# Patient Record
Sex: FEMALE | Race: WHITE | Employment: OTHER | URBAN - METROPOLITAN AREA
[De-identification: names, ages, dates, MRNs, and addresses within clinical notes are randomized per-mention and may not be internally consistent; named-entity substitution may affect disease eponyms.]

---

## 2017-09-28 ENCOUNTER — HOSPITAL ENCOUNTER (OUTPATIENT)
Dept: MAMMOGRAPHY | Age: 82
Discharge: HOME | End: 2017-09-28
Attending: INTERNAL MEDICINE
Payer: MEDICARE

## 2017-09-28 DIAGNOSIS — Z12.31 VISIT FOR SCREENING MAMMOGRAM: ICD-10-CM

## 2017-09-28 PROCEDURE — 77063 BREAST TOMOSYNTHESIS BI: CPT

## 2017-12-13 ENCOUNTER — HOSPITAL ENCOUNTER (OUTPATIENT)
Dept: GENERAL RADIOLOGY | Age: 82
Discharge: HOME | End: 2017-12-13
Attending: INTERNAL MEDICINE
Payer: MEDICARE

## 2017-12-13 DIAGNOSIS — M54.41 ACUTE BILATERAL LOW BACK PAIN WITH BILATERAL SCIATICA: ICD-10-CM

## 2017-12-13 DIAGNOSIS — M54.42 ACUTE BILATERAL LOW BACK PAIN WITH BILATERAL SCIATICA: ICD-10-CM

## 2017-12-13 PROCEDURE — 72100 X-RAY EXAM L-S SPINE 2/3 VWS: CPT

## 2017-12-14 NOTE — PROGRESS NOTES
pls call- no fracture seen. You do have moderate bilateral hip arthritis and degenerative disc disease at l3/4. Continue tylenol. Call if worse or if you develop leg weakness.

## 2018-10-29 ENCOUNTER — HOSPITAL ENCOUNTER (OUTPATIENT)
Dept: MAMMOGRAPHY | Age: 83
Discharge: HOME | End: 2018-10-29
Attending: INTERNAL MEDICINE
Payer: MEDICARE

## 2018-10-29 DIAGNOSIS — Z12.39 SCREENING BREAST EXAMINATION: ICD-10-CM

## 2018-10-29 PROCEDURE — 77067 SCR MAMMO BI INCL CAD: CPT

## 2019-01-10 ENCOUNTER — HOSPITAL ENCOUNTER (OUTPATIENT)
Dept: ULTRASOUND IMAGING | Age: 84
Discharge: HOME | End: 2019-01-10
Attending: INTERNAL MEDICINE
Payer: MEDICARE

## 2019-01-10 DIAGNOSIS — R14.0 BLOATING: ICD-10-CM

## 2019-01-10 PROCEDURE — 76700 US EXAM ABDOM COMPLETE: CPT

## 2019-01-11 NOTE — PROGRESS NOTES
pls call- abdominal ultrasound shows very small left kidney cyst.  Should not cause any problems.  Otherwise normal.

## 2019-07-18 PROBLEM — S09.90XA HEAD INJURY: Status: ACTIVE | Noted: 2019-07-18

## 2019-10-10 ENCOUNTER — HOSPITAL ENCOUNTER (OUTPATIENT)
Dept: GENERAL RADIOLOGY | Age: 84
Discharge: HOME | End: 2019-10-10
Attending: INTERNAL MEDICINE
Payer: MEDICARE

## 2019-10-10 DIAGNOSIS — R06.02 SOB (SHORTNESS OF BREATH): ICD-10-CM

## 2019-10-10 PROCEDURE — 71046 X-RAY EXAM CHEST 2 VIEWS: CPT

## 2019-11-21 ENCOUNTER — HOSPITAL ENCOUNTER (OUTPATIENT)
Dept: MAMMOGRAPHY | Age: 84
Discharge: HOME | End: 2019-11-21
Attending: INTERNAL MEDICINE
Payer: MEDICARE

## 2019-11-21 DIAGNOSIS — Z12.31 VISIT FOR SCREENING MAMMOGRAM: ICD-10-CM

## 2019-11-21 PROCEDURE — 77067 SCR MAMMO BI INCL CAD: CPT

## 2020-01-20 ENCOUNTER — OFFICE VISIT (OUTPATIENT)
Dept: NEUROLOGY | Age: 85
End: 2020-01-20

## 2020-01-20 VITALS
BODY MASS INDEX: 23.16 KG/M2 | SYSTOLIC BLOOD PRESSURE: 130 MMHG | RESPIRATION RATE: 18 BRPM | DIASTOLIC BLOOD PRESSURE: 82 MMHG | TEMPERATURE: 97.4 F | WEIGHT: 139 LBS | HEART RATE: 90 BPM | OXYGEN SATURATION: 97 % | HEIGHT: 65 IN

## 2020-01-20 DIAGNOSIS — R47.1 DYSARTHRIA: Primary | ICD-10-CM

## 2020-01-20 DIAGNOSIS — R26.9 GAIT DISTURBANCE: ICD-10-CM

## 2020-01-20 DIAGNOSIS — R29.898 WEAKNESS OF LEFT FOOT: ICD-10-CM

## 2020-01-20 RX ORDER — MELATONIN
1000 2 TIMES DAILY
COMMUNITY

## 2020-01-20 RX ORDER — FAMOTIDINE 10 MG/1
10 TABLET ORAL 2 TIMES DAILY
COMMUNITY

## 2020-01-20 RX ORDER — ACETAMINOPHEN 500 MG
TABLET ORAL
COMMUNITY

## 2020-01-20 NOTE — PROGRESS NOTES
Chief Complaint   Patient presents with    New Patient     C/O speech changes, and left foot drags when walking     Visit Vitals  /82 (BP 1 Location: Right arm, BP Patient Position: Sitting)   Pulse 90   Temp 97.4 °F (36.3 °C) (Oral)   Resp 18   Ht 5' 5\" (1.651 m)   Wt 63 kg (139 lb)   SpO2 97%   BMI 23.13 kg/m²

## 2020-01-21 ENCOUNTER — TELEPHONE (OUTPATIENT)
Dept: NEUROLOGY | Age: 85
End: 2020-01-21

## 2020-01-21 NOTE — TELEPHONE ENCOUNTER
----- Message from Earl Salazar sent at 1/21/2020 12:18 PM EST -----  Regarding: Dr. Nelson Muñiz Message/Vendor Calls    Caller's first and last name:Rubén Jama(son)      Reason for call: schedule MRI      Callback required yes/no and why:yes      Best contact number(s):[phone]      Details to clarify the request: Pt's son stated he received a call to schedule an appt for an MRI and requested a call back.       Earl Salazar

## 2020-01-22 NOTE — PROGRESS NOTES
Neurology Consult Note      HISTORY PROVIDED BY: patient, son, and brother-in-law a retired plastic surgeon. Chief Complaint:   Chief Complaint   Patient presents with    New Patient     C/O speech changes, and left foot drags when walking      Subjective:    Nico Mesa is a 82 y.o. right handed female who presents in consultation for speech and walking difficulties. Pt reports onset of sxs 2017, was speaking more softly, she was seen by ENT thought she might have acute laryngitis and is treated for GERD. Speech difficulty does not fluctuate, but is worse when she is fatigued. Feels that it has definitely progressed since onset. She started having left foot drop a year ago. Her balance is poor. No numbness reported. She was seen in Ortho by Dr. Briana Rosas 10/29/19 and told she had neuropathy. She was seen at [facility] speech and told her vocal cords were normal.  She is also seeing a speech pathologist at [facility]. No swallowing trouble, no formal swallowing eval.  She had NCS/EMG in ortho for CTS many years ago, none recently. No change in b/b. No fasciculations. Fractured right arm in mid-December, fell after toe caught on rug. Past Medical History:   Diagnosis Date    Atrial fibrillation ([address] 5/6/2015    CTS (carpal tunnel syndrome)     s/p release bilaterally    Environmental allergies     GERD (gastroesophageal reflux disease)     Hematuria 10/10/2011    Hypercholesterolemia 10/10/2011    Menopause     LMP-unknown    OA (osteoarthritis)     Seizure disorder ([address] 1983    had two seizures in sleep, one at daughter's wedding, all around the same time. On dilantin.     Vitamin D deficiency       Past Surgical History:   Procedure Laterality Date    D&C AFTER DELIVERY      HX BREAST BIOPSY Right long ago    Benign surgical biopsy    HX CARPAL TUNNEL RELEASE      SINUS SURGERY PROC UNLISTED        Social History     Socioeconomic History    Marital status: UNKNOWN     Spouse name: Not on file  Number of children: Not on file    Years of education: Not on file    Highest education level: Not on file   Occupational History    Occupation: Retired, [employer] and [employer] 1980 Financial resource strain: Not on file    Food insecurity:     Worry: Not on file     Inability: Not on file   PreViser needs:     Medical: Not on file     Non-medical: Not on file   Tobacco Use    Smoking status: Never Smoker    Smokeless tobacco: Never Used   Substance and Sexual Activity    Alcohol use: Yes     Alcohol/week: 7.0 standard drinks     Types: 7 Glasses of wine per week     Comment: wine occasionally    Drug use: Never    Sexual activity: Not on file   Lifestyle    Physical activity:     Days per week: Not on file     Minutes per session: Not on file    Stress: Not on file   Relationships    Social connections:     Talks on phone: Not on file     Gets together: Not on file     Attends Sikh service: Not on file     Active member of club or organization: Not on file     Attends meetings of clubs or organizations: Not on file     Relationship status: Not on file    Intimate partner violence:     Fear of current or ex partner: Not on file     Emotionally abused: Not on file     Physically abused: Not on file     Forced sexual activity: Not on file   Other Topics Concern    Not on file   Social History Narrative    Lives in [address], alone, has help at night.       Family History   Problem Relation Age of Onset    Other Mother         phlebitis, Dec 89yo    Dementia Mother     Pulmonary Fibrosis Father         Dec 79yo    No Known Problems Sister     Stroke Brother     Atrial Fibrillation Brother     Other Sister         PNA, Dec 85yo    Dementia Sister     No Known Problems Son     No Known Problems Son     No Known Problems Daughter          Objective:   ROS    Allergies   Allergen Reactions    Augmentin [Amoxicillin-Pot Clavulanate] Nausea and Vomiting    Nasonex [Mometasone] Unknown (comments)        Meds:  Outpatient Medications Prior to Visit   Medication Sig Dispense Refill    famotidine (PEPCID) 10 mg tablet Take 10 mg by mouth two (2) times a day.  cholecalciferol (VITAMIN D3) (1000 Units /25 mcg) tablet Take 1,000 Units by mouth two (2) times a day.  fexofenadine HCl (ALLEGRA PO) Take  by mouth.  acetaminophen (TYLENOL EXTRA STRENGTH) 500 mg tablet Take  by mouth every six (6) hours as needed for Pain.  phenytoin ER (DILANTIN ER) 100 mg ER capsule TAKE 1 CAPSULE TWICE A  Cap 4    apixaban (ELIQUIS) 5 mg tablet TAKE 1 TABLET TWICE A  Tab 4    multivitamins-minerals-lutein (CENTRUM SILVER) Tab Take  by mouth.  olopatadine (PATANOL) 0.1 % ophthalmic solution Administer 2 Drops to both eyes two (2) times a day.  furosemide (LASIX) 40 mg tablet Take 1 Tab by mouth daily as needed (swelling). 20 Tab 1    ciprofloxacin HCl (CIPRO) 500 mg tablet Take 1 Tab by mouth two (2) times a day. 14 Tab 1    omeprazole (PRILOSEC OTC) 20 mg tablet Take 20 mg by mouth daily. No facility-administered medications prior to visit. Imaging:  MRI Results (most recent):  No results found for this or any previous visit. CT Results (most recent):  No results found for this or any previous visit.      Reviewed records in [facility] and [facility] tab today    Lab Review   Results for orders placed or performed in visit on 12/27/19   VITAMIN D, 25 HYDROXY   Result Value Ref Range    VITAMIN D, 25-HYDROXY 28.2 (L) 30.0 - 105.8 ng/mL   METABOLIC PANEL, COMPREHENSIVE   Result Value Ref Range    Glucose 87 65 - 99 mg/dL    BUN 11 8 - 27 mg/dL    Creatinine 0.52 (L) 0.57 - 1.00 mg/dL    GFR est non-AA 89 >59 mL/min/1.73    GFR est  >59 mL/min/1.73    BUN/Creatinine ratio 21 12 - 28    Sodium 134 134 - 144 mmol/L    Potassium 4.3 3.5 - 5.2 mmol/L    Chloride 96 96 - 106 mmol/L    CO2 21 20 - 29 mmol/L    Calcium 9.0 8.7 - 10.3 mg/dL Protein, total 6.1 6.0 - 8.5 g/dL    Albumin 4.4 3.5 - 4.7 g/dL    GLOBULIN, TOTAL 1.7 1.5 - 4.5 g/dL    A-G Ratio 2.6 (H) 1.2 - 2.2    Bilirubin, total 0.7 0.0 - 1.2 mg/dL    Alk. phosphatase 117 39 - 117 IU/L    AST (SGOT) 25 0 - 40 IU/L    ALT (SGPT) 23 0 - 32 IU/L        Exam:  Visit Vitals  /82 (BP 1 Location: Right arm, BP Patient Position: Sitting)   Pulse 90   Temp 97.4 °F (36.3 °C) (Oral)   Resp 18   Ht 5' 5\" (1.651 m)   Wt 63 kg (139 lb)   LMP  (LMP Unknown)   SpO2 97%   BMI 23.13 kg/m²     General:  Alert, cooperative, no distress. Head:  Normocephalic, without obvious abnormality, atraumatic. Respiratory:  Heart:   Non labored breathing  Regular rate and rhythm, no murmurs   Neck:   2+ carotids, no bruits   Extremities: Warm, no cyanosis or edema. Pulses: 2+ radial pulses. Neurologic:  MS: Alert and oriented x 4, speech dysarthria, nasal quality, Language intact. Attention and fund of knowledge appropriate. Recent and remote memory not fully evaluated, seem to look to family to assist with details of history.    Cranial Nerves:  II: visual fields Full to confrontation   II: pupils Equal, round, reactive to light   II: optic disc    III,VII: ptosis none   III,IV,VI: extraocular muscles  EOMI, no nystagmus or diplopia   V: facial light touch sensation  normal   VII: facial muscle function   symmetric   VIII: hearing intact   IX: soft palate elevation  normal   XI: trapezius strength  5/5   XI: sternocleidomastoid strength 5/5   XII: tongue  Midline, atrophy, no fasciculations seen     Motor: normal bulk and tone, no tremor              Strength: 5/5 throughout except left DF 5-/5, no PD  Sensory: intact to LT, PP, mod dec vibratory sensation in left great toe  Coordination: FTN and HTS intact, AMANDA intact,Romberg negative  Gait: Slap foot gait on left  Reflexes: 2+ symmetric, toes downgoing         Assessment/Plan   Pt is an 82 y.o. right handed female with onset of speech changes in 2017, progressively worsening, and left foot drop beginning about one year ago. Exam with dysarthria, left foot drop, dec vibratory sensation in left great toe, slap foot gait on left, o/w unremarkable. Certainly motor neuron disease is strong consideration given bulbar as well as axial weakness, but no UMN findings appreciated on exam, atypical for PMA. She may have two separate issues such as stroke or mass causing dysarthria especially given multiple stroke risk factors, and second issue such as a peripheral peroneal neuropathy leading to foot drop. Recommend we start her evaluation with MRI brain without contrast and NCS/EMG. F/u in clinic in 6 weeks, instructed to call in the interim if needed. ICD-10-CM ICD-9-CM    1. Dysarthria R47.1 784.51 MRI BRAIN WO CONT      EMG NCV MOTOR WITH F/WAVE PER NERVE   2. Weakness of left foot R29.898 734 MRI BRAIN WO CONT      EMG NCV MOTOR WITH F/WAVE PER NERVE   3. Gait disturbance R26.9 781. 2 EMG NCV MOTOR WITH F/WAVE PER NERVE       Signed:   Franca Wallace MD  1/20/2020

## 2020-01-27 ENCOUNTER — HOSPITAL ENCOUNTER (OUTPATIENT)
Dept: GENERAL RADIOLOGY | Age: 85
Discharge: HOME | End: 2020-01-27
Attending: INTERNAL MEDICINE
Payer: MEDICARE
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
## 2020-01-27 PROCEDURE — 71101 X-RAY EXAM UNILAT RIBS/CHEST: CPT

## 2020-02-03 ENCOUNTER — OFFICE VISIT (OUTPATIENT)
Dept: NEUROLOGY | Age: 85
End: 2020-02-03

## 2020-02-03 VITALS
HEIGHT: 65 IN | RESPIRATION RATE: 16 BRPM | WEIGHT: 139 LBS | SYSTOLIC BLOOD PRESSURE: 135 MMHG | OXYGEN SATURATION: 97 % | HEART RATE: 83 BPM | BODY MASS INDEX: 23.16 KG/M2 | DIASTOLIC BLOOD PRESSURE: 73 MMHG

## 2020-02-03 DIAGNOSIS — R47.1 DYSARTHRIA: ICD-10-CM

## 2020-02-03 DIAGNOSIS — R29.898 LEFT LEG WEAKNESS: ICD-10-CM

## 2020-02-03 NOTE — PROGRESS NOTES
[address] [facility]  [address] [facility], [address] [place] [address]  Phone [phone] Fax [phone]  Test Date:  2/3/2020    Patient: Alyx Kennedy : 1935 Physician: Merle Lujan MD   Sex: Female Height: 5' 5\" Ref Phys: Brett , MD   ID#: [id number] Weight: 139 lbs. Technician: Jayashree Bardales, [employer]     Patient Complaints:  Left side    Patient History / Exam:  59-year-old female who is being evaluated for dysarthria and left lower extremity weakness. She drags her left foot when she walks and has foot drop. On exam: Alert and fully oriented. Cranial nerves II through XII intact. Muscle tone and bulk normal.  Strength is normal in all extremities including ankle dorsiflexion which is symmetric on both feet. DTRs hypoactive. Toes downgoing. Sensation impaired to vibration sense at the ankles. Romberg negative. Favors left lower extremity and slaps her left foot when she walks. NCV & EMG Findings:  Evaluation of the left median motor nerve showed reduced amplitude (2.7 mV) and decreased conduction velocity (Elbow-Wrist, 47 m/s). The left peroneal motor and the left Sup Peroneal sensory nerves showed reduced amplitude (L2.1, L4.1 µV). The left ulnar motor nerve showed decreased conduction velocity (B Elbow-Wrist, 49 m/s). The left median sensory nerve showed prolonged distal peak latency (4.1 ms) and decreased conduction velocity (Wrist-2nd Digit, 34 m/s). The left sural sensory nerve showed no response (Calf). The left median/ulnar (palm) comparison nerve showed abnormal peak latency difference (Median Palm-Ulnar Palm, 0.6 ms). All remaining nerves  were within normal limits. All F Wave latencies were within normal limits. Needle evaluation of the left anterior tibialis, the left peroneus longus, the left gastroc, and the left flexor digitorum longus muscles showed increased motor unit amplitude. All remaining muscles (as indicated in the following table) showed no evidence of electrical instability. Impression:  Left median motor conduction velocity was mildly slow. Median sensory amplitude was low. Palmar peak latency for the median segment was prolonged well compared to the ulnar segment. Ulnar motor and sensory parameters were unremarkable. Left peroneal motor amplitude was low. Left tibial motor study was normal.  Left superficial peroneal sensory response was mildly low. Sural sensory response could not be elicited. Concentric needle EMG was performed on selected muscles of the left upper and lower extremity. There was no abnormal insertional or spontaneous activity. Motor units were of increased size along with few polyphasics in distal left lower extremity muscles. Needle exam of left upper extremity was unremarkable. Needle exam of lower lumbar paraspinals was unremarkable. The electrodiagnostic testing shows:  1. 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 carpal tunnel syndrome    No electrodiagnostic findings to suggest an entrapment mononeuropathy in the left lower extremity or a progressive neurodegenerative motor neuron disease process that would explain patient's symptoms      ___________________________  Leida Rashid MD        Nerve Conduction Studies  Anti Sensory Summary Table     Stim Site NR Peak (ms) Norm Peak (ms) P-T Amp (µV) Norm P-T Amp Onset (ms) Site1 Site2 Delta-P (ms) Dist (cm) Too (m/s) Norm Too (m/s)   Left Median Anti Sensory (2nd Digit)  32.7°C   Wrist    4.1 <3.6 16.8 >10 3.3 Wrist 2nd Digit 4.1 14.0 34 >39   Left Radial Anti Sensory (Base 1st Digit)  33.2°C   Wrist    2.3 <3.1 30.2  2.0 Wrist Base 1st Digit 2.3 10.0 43    Left Sup Peroneal Anti Sensory (Ant Lat Mall)  32.8°C   14 cm    2.9 <4.4 4.1 >5.0 2.3 14 cm Ant Lat Mall 2.9 10.0 34 >32   Site 2 3.1  5.2  2.6         Site 3    2.9  4.5  2.5         Left Sural Anti Sensory (Lat Mall)  31.3°C   Calf NR  <4.0  >5.0  Calf Lat Mall  14.0  >35       19.8  0.3  17.6         Left Ulnar Anti Sensory (5th Digit)  32.8°C   Wrist    3.7 <3.7 16.7 >15.0 2.9 Wrist 5th Digit 3.7 14.0 38 >38     Motor Summary Table     Stim Site NR Onset (ms) Norm Onset (ms) O-P Amp (mV) Norm O-P Amp Site1 Site2 Delta-0 (ms) Dist (cm) Too (m/s) Norm Too (m/s)   Left Median Motor (Abd Poll Brev)  32.4°C   Wrist    3.8 <4.2 2.7 >5 Elbow Wrist 4.3 20.0 47 >50   Elbow    8.1  2.5          Left Peroneal Motor (Ext Dig Brev)  31.8°C   Ankle    4.7 <6.1 2.1 >2.5 B Fib Ankle 8.1 31.0 38 >38   B Fib    12.8  1.8  Poplt B Fib 2.2 10.0 45 >40   Poplt    15.0  1.7          Left Tibial Motor (Abd Jameson Brev)  32.2°C   Ankle    3.6 <6.1 3.2 >3.0 Knee Ankle 11.9 42.0 35 >35   Knee    15.5  2.1          Left Ulnar Motor (Abd Dig Minimi)  33.4°C   Wrist    3.1 <4.2 4.9 >3 B Elbow Wrist 4.1 20.0 49 >53   B Elbow    7.2  4.8  A Elbow B Elbow 1.9 10.0 53 >53   A Elbow    9.1  4.5            Comparison Summary Table     Stim Site NR Peak (ms) Norm Peak (ms) P-T Amp (µV) Site1 Site2 Delta-P (ms) Norm Delta (ms)   Left Median/Ulnar Palm Comparison (Wrist - 8cm)  33.1°C   Median Palm    2.5 <2.5 17.5 Median Palm Ulnar Palm 0.6 <0.3   Ulnar Palm    1.9 <2.5 14.4         F Wave Studies     NR F-Lat (ms) Lat Norm (ms) L-R F-Lat (ms) L-R Lat Norm   Left Tibial (Mrkrs) (Abd Hallucis)  32.3°C      46.65 <61  <5.7   Left Ulnar (Mrkrs) (Abd Dig Min)  33.5°C      29.12 <36  <2.5     EMG     Side Muscle Nerve Root Ins Act Fibs Psw Amp Dur Poly Recrt Int Pat Comment   Left AntTibialis Dp Br Peronel L4-5 Nml Nml Nml Incr Nml 0 Nml Nml    Left Peroneus Long Sup Br Peronel L5-S1 Nml Nml Nml Incr Nml 0 Nml Nml    Left Gastroc Tibial S1-2 Nml Nml Nml Incr Nml 0 Nml Nml    Left Flex Dig Long Tibial L5-S2 Nml Nml Nml Incr Nml 0 Nml Nml    Left VastusLat Femoral L2-4 Nml Nml Nml Nml Nml 0 Nml Nml    Left Lumbo Parasp Low Rami L5-S1 Nml Nml Nml         Left 1stDorInt Ulnar C8-T1 Nml Nml Nml Nml Nml 0 Nml Nml    Left BrachioRad Radial C5-6 Nml Nml Nml Nml Nml 0 Nml Nml    Left Triceps Radial C6-7-8 Nml Nml Nml Nml Nml 0 Nml Nml    Left Deltoid Axillary C5-6 Nml Nml Nml Nml Nml 0 Nml Nml          Waveforms: Corby J

## 2020-02-20 ENCOUNTER — HOSPITAL ENCOUNTER (OUTPATIENT)
Dept: MRI IMAGING | Age: 85
Discharge: HOME | End: 2020-02-20
Attending: PSYCHIATRY & NEUROLOGY
Payer: MEDICARE

## 2020-02-20 DIAGNOSIS — R47.1 DYSARTHRIA: ICD-10-CM

## 2020-02-20 DIAGNOSIS — R29.898 WEAKNESS OF LEFT FOOT: ICD-10-CM

## 2020-02-20 PROCEDURE — 70551 MRI BRAIN STEM W/O DYE: CPT

## 2020-02-28 ENCOUNTER — TELEPHONE (OUTPATIENT)
Dept: NEUROLOGY | Age: 85
End: 2020-02-28

## 2020-02-28 NOTE — TELEPHONE ENCOUNTER
----- Message from [facility] sent at 2/27/2020 10:46 AM EST -----  Regarding: FW: Test Results Question  Contact: [phone]    ----- Message -----  From: Karly Chapman  Sent: 2/26/2020   8:00 PM EST  To: [facility]  Subject: Test Results Question                            Dr. Venkatesh Delgado,  Would you please send me your analysis of my MRI results that I had on February 20th?   Thanks,  Cleveland Fraser

## 2020-03-02 NOTE — TELEPHONE ENCOUNTER
Leory Heimlich - Pt has an appt on 3/18/20 to discuss test results. There are no findings that require urgent attn.      (NCS/EMG on 2/3/20 - no explanation for sxs, has probable age related changes seen and mild CTS on left only. MRI brain wo contrast 2/20/20 - unremarkable.  Mild chronic ischemic changes.)

## 2020-03-03 NOTE — TELEPHONE ENCOUNTER
Spoke with patient noted she can speak with Dr. Srikanth Ballard at f/u and about results but gave them per Dr. Maxwell Cox notes.  Also confirmed that yes patient should still come to her appointment on 3/18/2020

## 2020-03-16 ENCOUNTER — TELEPHONE (OUTPATIENT)
Dept: NEUROLOGY | Age: 85
End: 2020-03-16

## 2020-03-16 DIAGNOSIS — R26.9 GAIT DISTURBANCE: ICD-10-CM

## 2020-03-16 DIAGNOSIS — R29.898 LEFT LEG WEAKNESS: Primary | ICD-10-CM

## 2020-03-16 DIAGNOSIS — R29.898 WEAKNESS OF LEFT FOOT: ICD-10-CM

## 2020-03-16 DIAGNOSIS — R47.1 DYSARTHRIA: ICD-10-CM

## 2020-03-16 NOTE — TELEPHONE ENCOUNTER
Dr. Calvillo Guard I rescheduled this patient that was supposed to come in for a 6 week f/u. Do to her speech impairment she would like you to call her son Jolie Lackey at  [phone], I believe. She has completed her MRI and EMG. She is aware that you will try to call some time this week. Patient notes no improvement in foot drop or speech.    Thank you   SLA

## 2020-03-24 NOTE — TELEPHONE ENCOUNTER
NCS/EMG on 2/3/20 - no explanation for sxs, has probable age related changes seen and mild CTS on left only. MRI brain wo contrast 2/20/20 - unremarkable. Mild chronic ischemic changes    NCS/EMG findings do not seem to correlate with exam. No EMG of tongue done. Recommend MRI L-spine to assess for lumbar stenosis and structural etiology. Referral to [facility]. Shilo Lewis - Please send NCS/EMG, Office note, MRI brain results with referral to [facility].

## 2020-03-25 ENCOUNTER — TELEPHONE (OUTPATIENT)
Dept: NEUROLOGY | Age: 85
End: 2020-03-25

## 2020-03-25 NOTE — TELEPHONE ENCOUNTER
Pt was referred by Dr Fanta Solis, they would like all of the office notes and full EMG results faxed to     Attn: Dr John [facility]  [phone]

## 2020-03-30 ENCOUNTER — TELEPHONE (OUTPATIENT)
Dept: NEUROLOGY | Age: 85
End: 2020-03-30

## 2020-03-30 NOTE — TELEPHONE ENCOUNTER
Calling to get most recent office notes faxed over to Dr Kellie Meckel (referred to by Bibi Sheridan)    Virtual visit on April 7th.      Fax: [phone]

## 2020-05-28 ENCOUNTER — HOSPITAL ENCOUNTER (OUTPATIENT)
Dept: MRI IMAGING | Age: 85
Discharge: HOME | End: 2020-05-28
Attending: PSYCHIATRY & NEUROLOGY
Payer: MEDICARE

## 2020-05-28 DIAGNOSIS — R29.898 WEAKNESS OF LEFT FOOT: ICD-10-CM

## 2020-05-28 DIAGNOSIS — R26.9 GAIT DISTURBANCE: ICD-10-CM

## 2020-05-28 DIAGNOSIS — R29.898 LEFT LEG WEAKNESS: ICD-10-CM

## 2020-05-28 PROCEDURE — 72148 MRI LUMBAR SPINE W/O DYE: CPT

## 2020-06-08 ENCOUNTER — DOCUMENTATION ONLY (OUTPATIENT)
Dept: NEUROLOGY | Age: 85
End: 2020-06-08

## 2020-06-08 ENCOUNTER — OFFICE VISIT (OUTPATIENT)
Dept: NEUROLOGY | Age: 85
End: 2020-06-08

## 2020-06-08 VITALS
DIASTOLIC BLOOD PRESSURE: 60 MMHG | BODY MASS INDEX: 21.66 KG/M2 | RESPIRATION RATE: 16 BRPM | OXYGEN SATURATION: 98 % | WEIGHT: 130 LBS | HEIGHT: 65 IN | SYSTOLIC BLOOD PRESSURE: 110 MMHG | HEART RATE: 82 BPM

## 2020-06-08 DIAGNOSIS — R29.898 LEFT LEG WEAKNESS: Primary | ICD-10-CM

## 2020-06-08 NOTE — PROGRESS NOTES
Requested records from Dr. Brittney Foster at [facility]. Dr. Gricelda Gatica referred patient to this doctor.

## 2020-06-08 NOTE — PROGRESS NOTES
Neurology Consult Note      HISTORY PROVIDED BY: patient, son and brother-in-law    Chief Complaint:   Chief Complaint   Patient presents with    Aphasia      Subjective:    Valente Johnston is a 82 y.o. right handed female initially and last seen in clinic on 1/20/20 with onset of speech changes in 2017, progressively worsening, and left foot drop beginning about one year ago. Exam with dysarthria, left foot drop, dec vibratory sensation in left great toe, slap foot gait on left, o/w unremarkable. Certainly motor neuron disease is strong consideration given bulbar as well as axial weakness, but no UMN findings appreciated on exam, atypical for PMA. She may have two separate issues such as stroke or mass causing dysarthria especially given multiple stroke risk factors, and second issue such as a peripheral peroneal neuropathy leading to foot drop. Recommend we start her evaluation with MRI brain without contrast and NCS/EMG. She returns for f/u. NCS/EMG on 2/3/20 - no explanation for sxs, has probable age related changes seen and mild CTS on left only. MRI brain wo contrast 2/20/20 reviewed in PACS with pt, unremarkable. Mild chronic ischemic changes only. NCS/EMG findings do not seem to correlate with exam. No EMG of tongue done. Recommended MRI L-spine to assess for lumbar stenosis and structural etiology - 5/28/20, multilevel changes seen no clear etiology for gait disturbance. Referred to [facility] 3/24/20. She has seen Dr. Cristal Fisher last week, note not available at this time. Pt does have an email correspondence from Dr. Cristal Fisher that mentions she will like need repeat NCS/EMG and has not had MRI C and T-spine yet. She ordered blood work for autoimmune issues. She felt that the speech and leg weakness are not related. She asked the pt which issue she would like to focus on first, speech or leg, and pt picked her leg weakness. Pt feels left foot weakness is worse. No new sxs. Past Medical History:   Diagnosis Date    Atrial fibrillation ([facility] 05/06/2015    CTS (carpal tunnel syndrome)     s/p release bilaterally    Environmental allergies     Fracture, humerus     GERD (gastroesophageal reflux disease)     Hematuria 10/10/2011    Hypercholesterolemia 10/10/2011    Menopause     LMP-unknown    OA (osteoarthritis)     Seizure disorder ([facility] 1983    had two seizures in sleep, one at daughter's wedding, all around the same time. On dilantin. 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 Financial resource strain: Not on file    Food insecurity     Worry: Not on file     Inability: Not on file   [employer] needs     Medical: Not on file     Non-medical: Not on file   Tobacco Use    Smoking status: Never Smoker    Smokeless tobacco: Never Used   Substance and Sexual Activity    Alcohol use:  Yes     Alcohol/week: 7.0 standard drinks     Types: 7 Glasses of wine per week     Comment: wine occasionally    Drug use: Never    Sexual activity: Not on file   Lifestyle    Physical activity     Days per week: Not on file     Minutes per session: Not on file    Stress: Not on file   Relationships    Social connections     Talks on phone: Not on file     Gets together: Not on file     Attends Zoroastrian service: Not on file     Active member of club or organization: Not on file     Attends meetings of clubs or organizations: Not on file     Relationship status: Not on file   [facility] Intimate partner violence     Fear of current or ex partner: Not on file     Emotionally abused: Not on file     Physically abused: Not on file     Forced sexual activity: Not on file   Other Topics Concern    Not on file   Social History Narrative    Lives in [place], alone, has help at night. Family History   Problem Relation Age of Onset    Other Mother         phlebitis, Dec 91yo    Dementia Mother     Pulmonary Fibrosis Father         Dec 81yo    No Known Problems Sister     Stroke Brother     Atrial Fibrillation Brother     Other Sister         PNA, Dec 83yo    Dementia Sister     No Known Problems Son     No Known Problems Son     No Known Problems Daughter          Objective:   ROS: Per HPI o/w reviewed and neg    Allergies   Allergen Reactions    Augmentin [Amoxicillin-Pot Clavulanate] Nausea and Vomiting    Iodinated Contrast Media Rash    Nasonex [Mometasone] Unknown (comments)        Meds:  Outpatient Medications Prior to Visit   Medication Sig Dispense Refill    famotidine (PEPCID) 10 mg tablet Take 10 mg by mouth two (2) times a day.  cholecalciferol (VITAMIN D3) (1000 Units /25 mcg) tablet Take 1,000 Units by mouth two (2) times a day.  fexofenadine HCl (ALLEGRA PO) Take  by mouth.  acetaminophen (TYLENOL EXTRA STRENGTH) 500 mg tablet Take  by mouth every six (6) hours as needed for Pain.  phenytoin ER (DILANTIN ER) 100 mg ER capsule TAKE 1 CAPSULE TWICE A  Cap 4    apixaban (ELIQUIS) 5 mg tablet TAKE 1 TABLET TWICE A  Tab 4    olopatadine (PATANOL) 0.1 % ophthalmic solution Administer 2 Drops to both eyes two (2) times a day.  ciprofloxacin HCl (CIPRO) 500 mg tablet TAKE 1 TABLET BY MOUTH TWICE A DAY 14 Tab 1    furosemide (LASIX) 40 mg tablet Take 1 Tab by mouth daily as needed (swelling). 20 Tab 1    omeprazole (PRILOSEC OTC) 20 mg tablet Take 20 mg by mouth daily.       multivitamins-minerals-lutein (CENTRUM SILVER) Tab Take  by mouth.       No facility-administered medications prior to visit. Imaging:  MRI Results (most recent):  Results from Hospital Encounter encounter on 05/28/20   MRI LUMB SPINE WO CONT    Narrative EXAM: MRI LUMB SPINE WO CONT    INDICATION: Pt with left foot drop without explanation on NCS/EMG, does have h/o  severe degenerative changes on Xray 2017. Other symptoms and signs involving the  musculoskeletal system    Exam: MRI of the lumbar spine. Sequences include sagittal and axial T1 and  T2-weighted images. Sagittal STIR. Comparisons: December 13, 2017    Contrast: None. Findings: There is transitional anatomy at the lumbosacral junction. For the  purposes of the study the transitional vertebral body will be labeled L5    There is 4 mm anterolisthesis of L3 on L4. Given rotation unchanged. There is  multilevel endplate degenerative change. Chronic superior plate deformity of L2. No marrow replacement or fracture. Cord terminus is within normal limits. Paraspinous soft tissues are within normal limits. T12-L1: No stenosis    L1-L2: There is degeneration of this disc with a small bulge. There are facet  degenerative changes. Borderline narrowing of the foramen    L2-L3: There is disc height loss with a small disc bulge. There are facet  degenerative changes. There is slight narrowing of the canal with narrowing of  the bilateral subarticular zones. Mild to moderate narrowing of the foramen    L3-L4: There is complete disc height loss with degeneration of this disc and a  small bulge. There are bilateral facet degenerative changes. There is mild  narrowing of the canal with narrowing of the right greater than left  subarticular zones. There is moderate to severe right foraminal narrowing at its  far lateral aspect. L4-L5: There is disc height loss with degeneration of this disc with a small  bulge with this is asymmetric to the left with left facet arthropathy.  There is  narrowing of the left subarticular zone. There is mild to moderate left  foraminal narrowing    L5-S1: L5 is transitional with no stenosis      Impression Impression:  1. L5 is transitional for the purposes of the study  2. Multilevel degenerative change detailed by level above most significant at  L3-L4  3. Chronic superior endplate deformity of L2. No acute fracture           CT Results (most recent):  No results found for this or any previous visit. Reviewed records in [employer] and media tab today    Lab Review   Results for orders placed or performed in visit on 12/27/19   VITAMIN D, 25 HYDROXY   Result Value Ref Range    VITAMIN D, 25-HYDROXY 28.2 (L) 30.0 - 552.0 ng/mL   METABOLIC PANEL, COMPREHENSIVE   Result Value Ref Range    Glucose 87 65 - 99 mg/dL    BUN 11 8 - 27 mg/dL    Creatinine 0.52 (L) 0.57 - 1.00 mg/dL    GFR est non-AA 89 >59 mL/min/1.73    GFR est  >59 mL/min/1.73    BUN/Creatinine ratio 21 12 - 28    Sodium 134 134 - 144 mmol/L    Potassium 4.3 3.5 - 5.2 mmol/L    Chloride 96 96 - 106 mmol/L    CO2 21 20 - 29 mmol/L    Calcium 9.0 8.7 - 10.3 mg/dL    Protein, total 6.1 6.0 - 8.5 g/dL    Albumin 4.4 3.5 - 4.7 g/dL    GLOBULIN, TOTAL 1.7 1.5 - 4.5 g/dL    A-G Ratio 2.6 (H) 1.2 - 2.2    Bilirubin, total 0.7 0.0 - 1.2 mg/dL    Alk. phosphatase 117 39 - 117 IU/L    AST (SGOT) 25 0 - 40 IU/L    ALT (SGPT) 23 0 - 32 IU/L        Exam:  Visit Vitals  /60 (BP 1 Location: Left arm, BP Patient Position: Sitting)   Pulse 82   Resp 16   Ht 5' 5\" (1.651 m)   Wt 59 kg (130 lb)   LMP  (LMP Unknown)   SpO2 98%   BMI 21.63 kg/m²     General:  Alert, cooperative, no distress. Head:  Normocephalic, without obvious abnormality, atraumatic. Respiratory:  Heart:   Non labored breathing  Regular rate and rhythm, no murmurs   Neck:      Extremities: Warm, no cyanosis or edema. Pulses: 2+ radial pulses.         Neurologic:  MS: Alert and oriented x 4, speech dysarthria, nasal quality, Language intact.  Attention and fund of knowledge appropriate. Recent and remote memory not fully evaluated, seem to look to family to assist with details of history. CN exam at initial OV:  Cranial Nerves:  II: visual fields Full to confrontation   II: pupils Equal, round, reactive to light   II: optic disc     III,VII: ptosis none   III,IV,VI: extraocular muscles  EOMI, no nystagmus or diplopia   V: facial light touch sensation  normal   VII: facial muscle function   symmetric   VIII: hearing intact   IX: soft palate elevation  normal   XI: trapezius strength  5/5   XI: sternocleidomastoid strength 5/5   XII: tongue  Midline, atrophy, no fasciculations seen      Motor: normal bulk and tone, no tremor              Strength: 5/5 throughout except left HF 5-/5, DF 5-/5, no PD  Sensory: (At initial OV: intact to LT, PP, mod dec vibratory sensation in left great toe)  Coordination: (At initial OV:FTN and HTS intact, AMANDA intact,Romberg negative)  Gait: (At initial OV: Slap foot gait on left)  Reflexes: 2+ symmetric       Assessment/Plan   Pt is an 82 y.o. right handed female presenting in January, 2020 with onset of speech changes in 2017, progressively worsening, and left foot drop beginning about one year prior, also worsening. Exam with dysarthria, mild left HF weakness and mild left foot drop, and at initial visit - dec vibratory sensation in left great toe, slap foot gait on left, o/w unremarkable. NCS/EMG on 2/3/20 without explanation for sxs, has probable age related changes seen and mild CTS on left only, EMG of tongue was not assessed. MRI brain wo contrast 2/20/20 is unremarkable. Mild chronic ischemic changes only. MRI L-spine 5/28/20, multilevel changes seen no clear etiology for leg weakness. Pt with worsening left LE weakness and ongoing severe dysarthria.   -Will order MRI C and T spine given recommendation by Dr. Yung Gray  -Will request office note from Dr. Yung Gray  -F/u made for 3 months, may change based on test results. ICD-10-CM ICD-9-CM    1. Left leg weakness R29.898 729.89 MRI CERV SPINE WO CONT      MRI [facility] SPINE WO CONT       Signed:   Neris Baldwin MD  6/8/2020

## 2020-06-08 NOTE — PROGRESS NOTES
Neurology Consult Note HISTORY PROVIDED BY: patient, son and brother-in-law Chief Complaint:  
Chief Complaint Patient presents with  Aphasia Subjective:  
 Genaro Smith is a 82 y.o. right handed female initially and last seen in clinic on 1/20/20 with onset of speech changes in 2017, progressively worsening, and left foot drop beginning about one year ago. Exam with dysarthria, left foot drop, dec vibratory sensation in left great toe, slap foot gait on left, o/w unremarkable. Certainly motor neuron disease is strong consideration given bulbar as well as axial weakness, but no UMN findings appreciated on exam, atypical for PMA. She may have two separate issues such as stroke or mass causing dysarthria especially given multiple stroke risk factors, and second issue such as a peripheral peroneal neuropathy leading to foot drop. Recommend we start her evaluation with MRI brain without contrast and NCS/EMG. She returns for f/u. NCS/EMG on 2/3/20 - no explanation for sxs, has probable age related changes seen and mild CTS on left only. MRI brain wo contrast 2/20/20 reviewed in PACS with pt, unremarkable. Mild chronic ischemic changes only. NCS/EMG findings do not seem to correlate with exam. No EMG of tongue done. Recommended MRI L-spine to assess for lumbar stenosis and structural etiology - 5/28/20, multilevel changes seen no clear etiology for gait disturbance. Referred to [facility] Neuromuscular specialist 3/24/20. She has seen Dr. Marilynn Riddle last week, note not available at this time. Pt does have an email correspondence from Dr. Marilynn Riddle that mentions she will like need repeat NCS/EMG and has not had MRI C and T-spine yet. She ordered blood work for autoimmune issues. She felt that the speech and leg weakness are not related. She asked the pt which issue she would like to focus on first, speech or leg, and pt picked her leg weakness. Pt feels left foot weakness is worse. No new sxs. Past Medical History:  
Diagnosis Date  Atrial fibrillation ([facility] 05/06/2015  CTS (carpal tunnel syndrome) s/p release bilaterally  Environmental allergies  Fracture, humerus  GERD (gastroesophageal reflux disease)  Hematuria 10/10/2011  Hypercholesterolemia 10/10/2011  Menopause LMP-unknown  OA (osteoarthritis)  Seizure disorder ([facility] 1983  
 had two seizures in sleep, one at daughter's wedding, all around the same time. On dilantin.  Vitamin D deficiency Past Surgical History:  
Procedure Laterality Date  D&C AFTER DELIVERY    
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  Financial resource strain: Not on file  Food insecurity Worry: Not on file Inability: Not on file  Transportation needs Medical: Not on file Non-medical: Not on file Tobacco Use  Smoking status: Never Smoker  Smokeless tobacco: Never Used Substance and Sexual Activity  Alcohol use: Yes Alcohol/week: 7.0 standard drinks Types: 7 Glasses of wine per week Comment: wine occasionally  Drug use: Never  Sexual activity: Not on file Lifestyle  Physical activity Days per week: Not on file Minutes per session: Not on file  Stress: Not on file Relationships  Social connections Talks on phone: Not on file Gets together: Not on file Attends Yarsani service: Not on file Active member of club or organization: Not on file Attends meetings of clubs or organizations: Not on file Relationship status: Not on file  Intimate partner violence Fear of current or ex partner: Not on file Emotionally abused: Not on file Physically abused: Not on file Forced sexual activity: Not on file Other Topics Concern  Not on file Social History Narrative Lives in [place], alone, has help at night. Family History Problem Relation Age of Onset  Other Mother   
     phlebitis, Dec 91yo  Dementia Mother  Pulmonary Fibrosis Father Dec 79yo  No Known Problems Sister  Stroke Brother  Atrial Fibrillation Brother  Other Sister PNA, Dec 85yo  Dementia Sister  No Known Problems Son  No Known Problems Son  No Known Problems Daughter Objective:  
ROS: Per HPI o/w reviewed and neg Allergies Allergen Reactions  Augmentin [Amoxicillin-Pot Clavulanate] Nausea and Vomiting  Iodinated Contrast Media Rash  Nasonex [Mometasone] Unknown (comments) Meds: Outpatient Medications Prior to Visit Medication Sig Dispense Refill  famotidine (PEPCID) 10 mg tablet Take 10 mg by mouth two (2) times a day.  cholecalciferol (VITAMIN D3) (1000 Units /25 mcg) tablet Take 1,000 Units by mouth two (2) times a day.  fexofenadine HCl (ALLEGRA PO) Take  by mouth.  acetaminophen (TYLENOL EXTRA STRENGTH) 500 mg tablet Take  by mouth every six (6) hours as needed for Pain.  phenytoin ER (DILANTIN ER) 100 mg ER capsule TAKE 1 CAPSULE TWICE A  Cap 4  
 apixaban (ELIQUIS) 5 mg tablet TAKE 1 TABLET TWICE A  Tab 4  
 olopatadine (PATANOL) 0.1 % ophthalmic solution Administer 2 Drops to both eyes two (2) times a day.  ciprofloxacin HCl (CIPRO) 500 mg tablet TAKE 1 TABLET BY MOUTH TWICE A DAY 14 Tab 1  
 furosemide (LASIX) 40 mg tablet Take 1 Tab by mouth daily as needed (swelling). 20 Tab 1  
 omeprazole (PRILOSEC OTC) 20 mg tablet Take 20 mg by mouth daily.  multivitamins-minerals-lutein (CENTRUM SILVER) Tab Take  by mouth. No facility-administered medications prior to visit. Imaging: MRI Results (most recent): 
Results from [facility] Encounter encounter on 05/28/20 MRI LUMB SPINE WO CONT Narrative EXAM: MRI LUMB SPINE WO CONT INDICATION: Pt with left foot drop without explanation on NCS/EMG, does have h/o 
severe degenerative changes on Xray 2017. Other symptoms and signs involving the 
musculoskeletal system Exam: MRI of the lumbar spine. Sequences include sagittal and axial T1 and 
T2-weighted images. Sagittal STIR. Comparisons: December 13, 2017 Contrast: None. Findings: There is transitional anatomy at the lumbosacral junction. For the 
purposes of the study the transitional vertebral body will be labeled L5 There is 4 mm anterolisthesis of L3 on L4. Given rotation unchanged. There is 
multilevel endplate degenerative change. Chronic superior plate deformity of L2. No marrow replacement or fracture. Cord terminus is within normal limits. Paraspinous soft tissues are within normal limits. T12-L1: No stenosis L1-L2: There is degeneration of this disc with a small bulge. There are facet 
degenerative changes. Borderline narrowing of the foramen L2-L3: There is disc height loss with a small disc bulge. There are facet 
degenerative changes. There is slight narrowing of the canal with narrowing of 
the bilateral subarticular zones. Mild to moderate narrowing of the foramen L3-L4: There is complete disc height loss with degeneration of this disc and a 
small bulge. There are bilateral facet degenerative changes. There is mild 
narrowing of the canal with narrowing of the right greater than left 
subarticular zones. There is moderate to severe right foraminal narrowing at its 
far lateral aspect. L4-L5: There is disc height loss with degeneration of this disc with a small bulge with this is asymmetric to the left with left facet arthropathy. There is 
narrowing of the left subarticular zone. There is mild to moderate left 
foraminal narrowing L5-S1: L5 is transitional with no stenosis Impression Impression: 1. L5 is transitional for the purposes of the study 2. Multilevel degenerative change detailed by level above most significant at L3-L4 3. Chronic superior endplate deformity of L2. No acute fracture CT Results (most recent): No results found for this or any previous visit. Reviewed records in [employer] and [employer] tab today Lab Review Results for orders placed or performed in visit on 12/27/19 VITAMIN D, 25 HYDROXY Result Value Ref Range VITAMIN D, 25-HYDROXY 28.2 (L) 30.0 - 100.0 ng/mL METABOLIC PANEL, COMPREHENSIVE Result Value Ref Range Glucose 87 65 - 99 mg/dL BUN 11 8 - 27 mg/dL Creatinine 0.52 (L) 0.57 - 1.00 mg/dL GFR est non-AA 89 >59 mL/min/1.73 GFR est  >59 mL/min/1.73  
 BUN/Creatinine ratio 21 12 - 28 Sodium 134 134 - 144 mmol/L Potassium 4.3 3.5 - 5.2 mmol/L Chloride 96 96 - 106 mmol/L  
 CO2 21 20 - 29 mmol/L Calcium 9.0 8.7 - 10.3 mg/dL Protein, total 6.1 6.0 - 8.5 g/dL Albumin 4.4 3.5 - 4.7 g/dL GLOBULIN, TOTAL 1.7 1.5 - 4.5 g/dL A-G Ratio 2.6 (H) 1.2 - 2.2 Bilirubin, total 0.7 0.0 - 1.2 mg/dL Alk. phosphatase 117 39 - 117 IU/L  
 AST (SGOT) 25 0 - 40 IU/L  
 ALT (SGPT) 23 0 - 32 IU/L Exam: 
Visit Vitals /60 (BP 1 Location: Left arm, BP Patient Position: Sitting) Pulse 82 Resp 16 Ht 5' 5\" (1.651 m) Wt 59 kg (130 lb) LMP  (LMP Unknown) SpO2 98% BMI 21.63 kg/m² General:  Alert, cooperative, no distress. Head:  Normocephalic, without obvious abnormality, atraumatic. Respiratory: 
Heart:   Non labored breathing Regular rate and rhythm, no murmurs Neck:     
Extremities: Warm, no cyanosis or edema.   
Pulses: 2+ radial pulses.  
     
 Neurologic:  MS: Alert and oriented x 4, speech dysarthria, nasal quality, Language intact. Attention and fund of knowledge appropriate. Recent and remote memory not fully evaluated, seem to look to family to assist with details of history. CN exam at initial OV: 
Cranial Nerves: 
II: visual fields Full to confrontation II: pupils Equal, round, reactive to light II: optic disc    
III,VII: ptosis none III,IV,VI: extraocular muscles  EOMI, no nystagmus or diplopia V: facial light touch sensation  normal  
VII: facial muscle function   symmetric VIII: hearing intact IX: soft palate elevation  normal  
XI: trapezius strength  5/5 XI: sternocleidomastoid strength 5/5 XII: tongue  Midline, atrophy, no fasciculations seen  
  
Motor: normal bulk and tone, no tremor Strength: 5/5 throughout except left HF 5-/5, DF 5-/5, no PD Sensory: (At initial OV: intact to LT, PP, mod dec vibratory sensation in left great toe) Coordination: (At initial OV:FTN and HTS intact, AMANDA intact,Romberg negative) Gait: (At initial OV: Slap foot gait on left) Reflexes: 2+ symmetric Assessment/Plan Pt is an 82 y.o. right handed female presenting in January, 2020 with onset of speech changes in 2017, progressively worsening, and left foot drop beginning about one year prior, also worsening. Exam with dysarthria, mild left HF weakness and mild left foot drop, and at initial visit - dec vibratory sensation in left great toe, slap foot gait on left, o/w unremarkable. NCS/EMG on 2/3/20 without explanation for sxs, has probable age related changes seen and mild CTS on left only, EMG of tongue was not assessed. MRI brain wo contrast 2/20/20 is unremarkable. Mild chronic ischemic changes only. MRI L-spine 5/28/20, multilevel changes seen no clear etiology for leg weakness. Pt with worsening left LE weakness and ongoing severe dysarthria.  
-Will order MRI C and T spine given recommendation by Dr. Russell Avendano -Will request office note from Dr. Nathanael Sorenson 
-F/u made for 3 months, may change based on test results. ICD-10-CM ICD-9-CM 1. Left leg weakness R29.898 729.89 MRI CERV SPINE WO CONT  
   MRI [facility] SPINE WO CONT Signed: Abdelrahman Hummel MD 
6/8/2020

## 2020-06-30 ENCOUNTER — HOSPITAL ENCOUNTER (OUTPATIENT)
Dept: MRI IMAGING | Age: 85
Discharge: HOME | End: 2020-06-30
Attending: PSYCHIATRY & NEUROLOGY
Payer: MEDICARE

## 2020-06-30 DIAGNOSIS — R29.898 LEFT LEG WEAKNESS: ICD-10-CM

## 2020-06-30 PROCEDURE — 72141 MRI NECK SPINE W/O DYE: CPT

## 2020-06-30 PROCEDURE — 72146 MRI CHEST SPINE W/O DYE: CPT

## 2020-07-02 NOTE — PROGRESS NOTES
Angelia Jordan - Please call pt: MRI C and T-spine wo contrast 6/30/20 without explanation for symptoms. She does have age related degenerative changes with narrowing of spinal canal in places, but this would not explain her sxs. We can review imaging together at f/u. Please forward MRI results to Dr. Shante Mariano at [phone] [facility] Neuromuscular [facility].

## 2020-09-02 ENCOUNTER — TELEPHONE (OUTPATIENT)
Dept: NEUROLOGY | Facility: CLINIC | Age: 85
End: 2020-09-02

## 2020-09-02 NOTE — TELEPHONE ENCOUNTER
Pt's son calling to find out if the pt needs to keep her 9/8 appt because Dr. Timmy Mckenna referred pt to Dr. Michael Carrero.  Please call

## 2020-09-04 NOTE — TELEPHONE ENCOUNTER
Jerrell - Please return call: That is up to them. If they feel Dr. Lorri Conway is now managing her issue, then she does not have to f/u with me.

## 2020-10-05 ENCOUNTER — APPOINTMENT (OUTPATIENT)
Dept: GENERAL RADIOLOGY | Age: 85
End: 2020-10-05
Attending: EMERGENCY MEDICINE
Payer: MEDICARE

## 2020-10-05 ENCOUNTER — HOSPITAL ENCOUNTER (OUTPATIENT)
Age: 85
Setting detail: OBSERVATION
Discharge: HOME | End: 2020-10-06
Attending: EMERGENCY MEDICINE | Admitting: INTERNAL MEDICINE
Payer: MEDICARE

## 2020-10-05 ENCOUNTER — APPOINTMENT (OUTPATIENT)
Dept: MRI IMAGING | Age: 85
End: 2020-10-05
Attending: EMERGENCY MEDICINE
Payer: MEDICARE

## 2020-10-05 ENCOUNTER — APPOINTMENT (OUTPATIENT)
Dept: INTERVENTIONAL RADIOLOGY/VASCULAR | Age: 85
End: 2020-10-05
Attending: EMERGENCY MEDICINE
Payer: MEDICARE

## 2020-10-05 ENCOUNTER — APPOINTMENT (OUTPATIENT)
Dept: CT IMAGING | Age: 85
End: 2020-10-05
Attending: EMERGENCY MEDICINE
Payer: MEDICARE

## 2020-10-05 DIAGNOSIS — S09.90XA INJURY OF HEAD, INITIAL ENCOUNTER: Primary | ICD-10-CM

## 2020-10-05 DIAGNOSIS — S20.212A CONTUSION OF RIB ON LEFT SIDE, INITIAL ENCOUNTER: ICD-10-CM

## 2020-10-05 DIAGNOSIS — S32.010A COMPRESSION FRACTURE OF L1 LUMBAR VERTEBRA, CLOSED, INITIAL ENCOUNTER (HCC): ICD-10-CM

## 2020-10-05 PROBLEM — S32.000A LUMBAR COMPRESSION FRACTURE (HCC): Status: ACTIVE | Noted: 2020-10-05

## 2020-10-05 PROCEDURE — C1713 ANCHOR/SCREW BN/BN,TIS/BN: HCPCS

## 2020-10-05 PROCEDURE — 77030003666 HC NDL SPINAL BD -A

## 2020-10-05 PROCEDURE — 72100 X-RAY EXAM L-S SPINE 2/3 VWS: CPT

## 2020-10-05 PROCEDURE — 74011000250 HC RX REV CODE- 250: Performed by: EMERGENCY MEDICINE

## 2020-10-05 PROCEDURE — 2709999900 HC NON-CHARGEABLE SUPPLY

## 2020-10-05 PROCEDURE — 74011000250 HC RX REV CODE- 250

## 2020-10-05 PROCEDURE — 74011000250 HC RX REV CODE- 250: Performed by: RADIOLOGY

## 2020-10-05 PROCEDURE — 99218 HC RM OBSERVATION: CPT

## 2020-10-05 PROCEDURE — 22514 PERQ VERTEBRAL AUGMENTATION: CPT

## 2020-10-05 PROCEDURE — 72148 MRI LUMBAR SPINE W/O DYE: CPT

## 2020-10-05 PROCEDURE — 71101 X-RAY EXAM UNILAT RIBS/CHEST: CPT

## 2020-10-05 PROCEDURE — 74011250636 HC RX REV CODE- 250/636

## 2020-10-05 PROCEDURE — 99284 EMERGENCY DEPT VISIT MOD MDM: CPT

## 2020-10-05 PROCEDURE — 99152 MOD SED SAME PHYS/QHP 5/>YRS: CPT

## 2020-10-05 PROCEDURE — 74011250636 HC RX REV CODE- 250/636: Performed by: RADIOLOGY

## 2020-10-05 PROCEDURE — 77030021783 HC SYS CEM DEL MEDT -D

## 2020-10-05 PROCEDURE — 77030034842 HC TAMP SPN BN INFL EXP II KYPH -I

## 2020-10-05 PROCEDURE — 74011250637 HC RX REV CODE- 250/637: Performed by: INTERNAL MEDICINE

## 2020-10-05 PROCEDURE — A9585 GADOBUTROL INJECTION: HCPCS | Performed by: RADIOLOGY

## 2020-10-05 PROCEDURE — 99153 MOD SED SAME PHYS/QHP EA: CPT

## 2020-10-05 PROCEDURE — 70450 CT HEAD/BRAIN W/O DYE: CPT

## 2020-10-05 PROCEDURE — 77030021782 HC SYS CEM CART DEL KYPH -C

## 2020-10-05 RX ORDER — CEFAZOLIN SODIUM/WATER 2 G/20 ML
2 SYRINGE (ML) INTRAVENOUS ONCE
Status: COMPLETED | OUTPATIENT
Start: 2020-10-05 | End: 2020-10-05

## 2020-10-05 RX ORDER — LIDOCAINE 4 G/100G
1 PATCH TOPICAL
Status: COMPLETED | OUTPATIENT
Start: 2020-10-05 | End: 2020-10-05

## 2020-10-05 RX ORDER — ACETAMINOPHEN 325 MG/1
650 TABLET ORAL
Status: DISCONTINUED | OUTPATIENT
Start: 2020-10-05 | End: 2020-10-06 | Stop reason: HOSPADM

## 2020-10-05 RX ORDER — RILUZOLE 50 MG/1
50 TABLET, FILM COATED ORAL EVERY 12 HOURS
Status: DISCONTINUED | OUTPATIENT
Start: 2020-10-06 | End: 2020-10-06 | Stop reason: HOSPADM

## 2020-10-05 RX ORDER — KETOROLAC TROMETHAMINE 30 MG/ML
15 INJECTION, SOLUTION INTRAMUSCULAR; INTRAVENOUS ONCE
Status: COMPLETED | OUTPATIENT
Start: 2020-10-05 | End: 2020-10-05

## 2020-10-05 RX ORDER — MIDAZOLAM HYDROCHLORIDE 1 MG/ML
5 INJECTION, SOLUTION INTRAMUSCULAR; INTRAVENOUS
Status: DISCONTINUED | OUTPATIENT
Start: 2020-10-05 | End: 2020-10-05

## 2020-10-05 RX ORDER — RILUZOLE 50 MG/1
50 TABLET, FILM COATED ORAL EVERY 12 HOURS
COMMUNITY
End: 2021-10-22 | Stop reason: SDUPTHER

## 2020-10-05 RX ORDER — SODIUM CHLORIDE 0.9 % (FLUSH) 0.9 %
5-40 SYRINGE (ML) INJECTION AS NEEDED
Status: DISCONTINUED | OUTPATIENT
Start: 2020-10-05 | End: 2020-10-06 | Stop reason: HOSPADM

## 2020-10-05 RX ORDER — LIDOCAINE HYDROCHLORIDE 20 MG/ML
20 INJECTION, SOLUTION INFILTRATION; PERINEURAL ONCE
Status: COMPLETED | OUTPATIENT
Start: 2020-10-05 | End: 2020-10-05

## 2020-10-05 RX ORDER — FEXOFENADINE HCL 60 MG
180 TABLET ORAL
COMMUNITY

## 2020-10-05 RX ORDER — SODIUM CHLORIDE 9 MG/ML
500 INJECTION, SOLUTION INTRAVENOUS CONTINUOUS
Status: DISCONTINUED | OUTPATIENT
Start: 2020-10-05 | End: 2020-10-05

## 2020-10-05 RX ORDER — LIDOCAINE HYDROCHLORIDE 20 MG/ML
INJECTION, SOLUTION INFILTRATION; PERINEURAL
Status: COMPLETED
Start: 2020-10-05 | End: 2020-10-05

## 2020-10-05 RX ORDER — BUPIVACAINE HYDROCHLORIDE 5 MG/ML
20 INJECTION, SOLUTION EPIDURAL; INTRACAUDAL
Status: COMPLETED | OUTPATIENT
Start: 2020-10-05 | End: 2020-10-05

## 2020-10-05 RX ORDER — FUROSEMIDE 40 MG/1
40 TABLET ORAL
Status: CANCELLED | OUTPATIENT
Start: 2020-10-05

## 2020-10-05 RX ORDER — LIDOCAINE 4 G/100G
1 PATCH TOPICAL EVERY 24 HOURS
Qty: 7 PATCH | Refills: 0 | Status: SHIPPED | OUTPATIENT
Start: 2020-10-05 | End: 2020-10-12

## 2020-10-05 RX ORDER — PHENYTOIN SODIUM 100 MG/1
100 CAPSULE, EXTENDED RELEASE ORAL 2 TIMES DAILY
Status: DISCONTINUED | OUTPATIENT
Start: 2020-10-05 | End: 2020-10-06 | Stop reason: HOSPADM

## 2020-10-05 RX ORDER — FAMOTIDINE 20 MG/1
10 TABLET, FILM COATED ORAL 2 TIMES DAILY
Status: DISCONTINUED | OUTPATIENT
Start: 2020-10-05 | End: 2020-10-06 | Stop reason: HOSPADM

## 2020-10-05 RX ORDER — SODIUM CHLORIDE 0.9 % (FLUSH) 0.9 %
5-40 SYRINGE (ML) INJECTION EVERY 8 HOURS
Status: DISCONTINUED | OUTPATIENT
Start: 2020-10-05 | End: 2020-10-06 | Stop reason: HOSPADM

## 2020-10-05 RX ORDER — SODIUM CHLORIDE 0.9 % (FLUSH) 0.9 %
10 SYRINGE (ML) INJECTION AS NEEDED
Status: DISCONTINUED | OUTPATIENT
Start: 2020-10-05 | End: 2020-10-05

## 2020-10-05 RX ORDER — FENTANYL CITRATE 50 UG/ML
200 INJECTION, SOLUTION INTRAMUSCULAR; INTRAVENOUS
Status: DISCONTINUED | OUTPATIENT
Start: 2020-10-05 | End: 2020-10-05

## 2020-10-05 RX ORDER — TRAMADOL HYDROCHLORIDE 50 MG/1
50 TABLET ORAL
Status: DISCONTINUED | OUTPATIENT
Start: 2020-10-05 | End: 2020-10-06 | Stop reason: HOSPADM

## 2020-10-05 RX ORDER — MIDAZOLAM HYDROCHLORIDE 1 MG/ML
INJECTION, SOLUTION INTRAMUSCULAR; INTRAVENOUS
Status: COMPLETED
Start: 2020-10-05 | End: 2020-10-05

## 2020-10-05 RX ADMIN — PHENYTOIN SODIUM 100 MG: 100 CAPSULE ORAL at 21:43

## 2020-10-05 RX ADMIN — LIDOCAINE HYDROCHLORIDE 10 ML: 20 INJECTION, SOLUTION INFILTRATION; PERINEURAL at 17:55

## 2020-10-05 RX ADMIN — FENTANYL CITRATE 50 MCG: 50 INJECTION, SOLUTION INTRAMUSCULAR; INTRAVENOUS at 17:47

## 2020-10-05 RX ADMIN — FAMOTIDINE 10 MG: 20 TABLET ORAL at 21:43

## 2020-10-05 RX ADMIN — GADOBUTROL 20 ML: 604.72 INJECTION INTRAVENOUS at 18:04

## 2020-10-05 RX ADMIN — MIDAZOLAM HYDROCHLORIDE 1 MG: 1 INJECTION, SOLUTION INTRAMUSCULAR; INTRAVENOUS at 17:34

## 2020-10-05 RX ADMIN — MIDAZOLAM HYDROCHLORIDE 0.5 MG: 1 INJECTION, SOLUTION INTRAMUSCULAR; INTRAVENOUS at 16:00

## 2020-10-05 RX ADMIN — FENTANYL CITRATE 25 MCG: 50 INJECTION, SOLUTION INTRAMUSCULAR; INTRAVENOUS at 17:56

## 2020-10-05 RX ADMIN — KETOROLAC TROMETHAMINE 15 MG: 30 INJECTION, SOLUTION INTRAMUSCULAR at 16:34

## 2020-10-05 RX ADMIN — SODIUM CHLORIDE 500 ML: 900 INJECTION, SOLUTION INTRAVENOUS at 16:34

## 2020-10-05 RX ADMIN — ACETAMINOPHEN 650 MG: 325 TABLET ORAL at 21:50

## 2020-10-05 RX ADMIN — MIDAZOLAM HYDROCHLORIDE 0.5 MG: 1 INJECTION, SOLUTION INTRAMUSCULAR; INTRAVENOUS at 17:57

## 2020-10-05 RX ADMIN — FENTANYL CITRATE 25 MCG: 50 INJECTION, SOLUTION INTRAMUSCULAR; INTRAVENOUS at 16:41

## 2020-10-05 RX ADMIN — CEFAZOLIN SODIUM 2 G: 300 INJECTION, POWDER, LYOPHILIZED, FOR SOLUTION INTRAVENOUS at 16:34

## 2020-10-05 RX ADMIN — APIXABAN 5 MG: 2.5 TABLET, FILM COATED ORAL at 21:43

## 2020-10-05 RX ADMIN — BUPIVACAINE HYDROCHLORIDE 10 ML: 5 INJECTION, SOLUTION EPIDURAL; INTRACAUDAL; PERINEURAL at 17:54

## 2020-10-05 NOTE — ED NOTES
Bedside and Verbal shift change report given to [address] (oncoming nurse) by [address] (offgoing nurse). Report included the following information SBAR, Kardex, ED Summary, [facility] and Recent Results.

## 2020-10-05 NOTE — H&P
History and Physical    Subjective:     Valeria Faye is a 82 y.o.  female who presents with multiple falls and back pain. Patient diagnosed with ALS  Recently. Zulma Vinny again this am hitting her head. She fell last week opening her car door and falling back due to loss of balance. She has had back pain since then. .   Past Medical History:   Diagnosis Date    Atrial fibrillation ([facility] 05/06/2015    CTS (carpal tunnel syndrome)     s/p release bilaterally    Environmental allergies     Fracture, humerus     GERD (gastroesophageal reflux disease)     Hematuria 10/10/2011    Hypercholesterolemia 10/10/2011    Menopause     LMP-unknown    OA (osteoarthritis)     Seizure disorder ([facility] [address] 1983    had two seizures in sleep, one at daughter's wedding, all around the same time. On dilantin.  Vitamin D deficiency      Allergies   Allergen Reactions    Augmentin [Amoxicillin-Pot Clavulanate] Nausea and Vomiting    Iodinated Contrast Media Rash    Nasonex [Mometasone] Unknown (comments)     Prior to Admission medications    Medication Sig Start Date End Date Taking? Authorizing Provider   lidocaine 4 % patch 1 Patch by TransDERmal route every twenty-four (24) hours for 7 days. 10/5/20 10/12/20 Yes Porsche Ashley MD   riluzole (RILUTEK) tablet Take 50 mg by mouth every twelve (12) hours. Yes Provider, Historical   fexofenadine (ALLEGRA) 60 mg tablet Take 180 mg by mouth daily as needed for Allergies. Yes Provider, Historical   famotidine (PEPCID) 10 mg tablet Take 10 mg by mouth two (2) times a day.    Yes Provider, Historical   phenytoin ER (DILANTIN ER) 100 mg ER capsule TAKE 1 CAPSULE TWICE A DAY 1/9/20  Yes Derrek Munroe MD   apixaban (ELIQUIS) 5 mg tablet TAKE 1 TABLET TWICE A DAY 10/2/19  Yes Derrek Munroe MD   ciprofloxacin HCl (CIPRO) 500 mg tablet TAKE 1 TABLET BY MOUTH TWICE A DAY 9/11/20   Mayelin Rose MD   cholecalciferol (VITAMIN D3) (1000 Units /25 mcg) tablet Take 1,000 Units by mouth two (2) times a day. Provider, Historical   acetaminophen (TYLENOL EXTRA STRENGTH) 500 mg tablet Take  by mouth every six (6) hours as needed for Pain. Provider, Historical   olopatadine (PATANOL) 0.1 % ophthalmic solution Administer 2 Drops to both eyes two (2) times a day. Provider, Historical     Social History     Tobacco Use    Smoking status: Never Smoker    Smokeless tobacco: Never Used   Substance Use Topics    Alcohol use: Yes     Alcohol/week: 7.0 standard drinks     Types: 7 Glasses of wine per week     Comment: wine occasionally     Family History   Problem Relation Age of Onset    Other Mother         phlebitis, Dec 89yo    Dementia Mother     Pulmonary Fibrosis Father         Dec 81yo    No Known Problems Sister     Stroke Brother     Atrial Fibrillation Brother     Other Sister         PNA, Dec 85yo    Dementia Sister     No Known Problems Son     No Known Problems Son     No Known Problems Daughter                Review of Systems:  As above. Objective:       Physical Exam: frail elderly wf  In moderate distress. - difficult to understand due to als affecting speech. HEENT -- Pupils round. O/P Clear. Neck -- Supple. No JVD. Heart -- irreg  Lungs -- CTA. Abdomen -- Soft. Non-tender. Non-distended. No masses. Bowel sounds present. Extremities -- + 1 edema. Data Review:   No results found for this or any previous visit (from the past 24 hour(s)). Chest x-ray    EKG  Assessment:     Active Problems:    Lumbar compression fracture ([address] (10/5/2020)        Plan:     Compression fracture- admit for observation and kyphoplasty.   Pain management= tramadol  A fib- continue eliquis    Signed By: Makenna Dove MD     October 5, 2020

## 2020-10-05 NOTE — ED TRIAGE NOTES
Arrives with family after having an unwitnessed glf that occurred around 2200 when she went to the restroom. C/o of LEFT rib pain, worsens with inspiration and arrives with small lac to LLE. Denies LOC. +posteror head pain. Uses rollator to ambulate. Hx of ALS.  On eliquis    PCP: Jose

## 2020-10-05 NOTE — ED NOTES
8: 62 AM  Patient has been seen by Dr. Brian Beth. Awaiting the MRI scan of the LS spine. Dr. Michael Mejia has spoken with IR about the possiblity of Kyphoplasty.

## 2020-10-05 NOTE — PROGRESS NOTES
Admission Medication Reconciliation:        Mr Ade Marie provided updated medication list:    Deleted:  · Furosemide  · Lidocaine patch  · Multivitamins  · Omeprazole    ++++++++++++++++++++++++++++++++++++++++++++++++++++++++++++++      In progress:    Spoke with son Mike Harmon) by telephone @ [phone], unable to speak with patient face to face at this time due to general isolation precautions in the ED related to COVID-19 pandemic. Likewise she has difficulty speaking due to ALS. Son stated that he would get copy of medication list and allergies from patient by text, and will reach back out to me when he has the updated information. I will update chart once current information is available. Medication changes (since last review): Added:   Riluzole (for ALS)        Thank you for allowing me to participate in the care of your patient. Winsome Herron PharmD, RN # [phone]       [facility] pharmacy benefit data reflects medications filled and processed through the patient's insurance, however   this data does NOT capture whether the medication was picked up or is currently being taken by the patient. Allergies:  Augmentin [amoxicillin-pot clavulanate]; Iodinated contrast media; and Nasonex [mometasone]    Significant PMH/Disease States:   Past Medical History:   Diagnosis Date    Atrial fibrillation ([facility] [address] 05/06/2015    CTS (carpal tunnel syndrome)     s/p release bilaterally    Environmental allergies     Fracture, humerus     GERD (gastroesophageal reflux disease)     Hematuria 10/10/2011    Hypercholesterolemia 10/10/2011    Menopause     LMP-unknown    OA (osteoarthritis)     Seizure disorder ([facility] [address] 1983    had two seizures in sleep, one at daughter's wedding, all around the same time. On dilantin.     Vitamin D deficiency      Chief Complaint for this Admission:    Chief Complaint   Patient presents with    Fall    Rib Pain     Prior to Admission Medications:   Prior to Admission Medications   Prescriptions Last Dose Informant Taking?   acetaminophen (TYLENOL EXTRA STRENGTH) 500 mg tablet   No   Sig: Take  by mouth every six (6) hours as needed for Pain. apixaban (ELIQUIS) 5 mg tablet   Yes   Sig: TAKE 1 TABLET TWICE A DAY   cholecalciferol (VITAMIN D3) (1000 Units /25 mcg) tablet   No   Sig: Take 1,000 Units by mouth two (2) times a day. ciprofloxacin HCl (CIPRO) 500 mg tablet   No   Sig: TAKE 1 TABLET BY MOUTH TWICE A DAY   famotidine (PEPCID) 10 mg tablet   No   Sig: Take 10 mg by mouth two (2) times a day. fexofenadine HCl (ALLEGRA PO)   No   Sig: Take  by mouth. furosemide (LASIX) 40 mg tablet   No   Sig: Take 1 Tab by mouth daily as needed (swelling). multivitamins-minerals-lutein (CENTRUM SILVER) Tab   No   Sig: Take  by mouth. olopatadine (PATANOL) 0.1 % ophthalmic solution   No   Sig: Administer 2 Drops to both eyes two (2) times a day. omeprazole (PRILOSEC OTC) 20 mg tablet   No   Sig: Take 20 mg by mouth daily. phenytoin ER (DILANTIN ER) 100 mg ER capsule   No   Sig: TAKE 1 CAPSULE TWICE A DAY   riluzole (RILUTEK) tablet   Yes   Sig: Take 50 mg by mouth every twelve (12) hours. Facility-Administered Medications: None       Please contact the main inpatient pharmacy with any questions or concerns at [phone] and we will direct you to the clinical pharmacist covering this patient's care while in-house.    YESSY Jensen

## 2020-10-05 NOTE — ED PROVIDER NOTES
16-year-old female presents from home via private vehicle accompanied by her care provider after a ground-level fall. Patient has ALS and has difficulty speaking so the history is primarily obtained from the care provider. Care provider states that the patient was in the bathroom around 10 PM when she lost her balance falling backwards striking her left flank and the back of her head. Patient complaining of pain along the left posterior costal margin. Pain worsened with movement and deep inspiration. She took some Tylenol at home which seemed to help improve the pain. She denies any loss of consciousness. No headache at this time. No nausea or vomiting. She does take Eliquis for atrial fibrillation. The primary care provider added that the patient had another fall a few days ago and has been having some low back pain since then was scheduled to see the primary care doctor later today for evaluation of that. Past Medical History:   Diagnosis Date    [facility] 05/06/2015    CTS (carpal tunnel syndrome)     s/p release bilaterally    Environmental allergies     Fracture, humerus     GERD (gastroesophageal reflux disease)     Hematuria 10/10/2011    Hypercholesterolemia 10/10/2011    Menopause     LMP-unknown    OA (osteoarthritis)     Seizure disorder ([facility] [address] 1983    had two seizures in sleep, one at daughter's wedding, all around the same time. On dilantin.     Vitamin D deficiency        Past Surgical History:   Procedure Laterality Date    D&C AFTER DELIVERY      HX BREAST BIOPSY Right long ago    Benign surgical biopsy    HX CARPAL TUNNEL RELEASE      SINUS SURGERY PROC UNLISTED           Family History:   Problem Relation Age of Onset    Other Mother         phlebitis, Dec 91yo    Dementia Mother     Pulmonary Fibrosis Father         Dec 79yo    No Known Problems Sister     Stroke Brother     Atrial Fibrillation Brother     Other Sister         PNA, Dec 85yo  Dementia Sister     No Known Problems Son     No Known Problems Son     No Known Problems Daughter        Social History     Socioeconomic History    Marital status:      Spouse name: Not on file    Number of children: Not on file    Years of education: Not on file    Highest education level: Not on file   Occupational History    Occupation: Retired, [employer] 1980 Financial resource strain: Not on file    Food insecurity     Worry: Not on file     Inability: Not on file   [employer] needs     Medical: Not on file     Non-medical: Not on file   Tobacco Use    Smoking status: Never Smoker    Smokeless tobacco: Never Used   Substance and Sexual Activity    Alcohol use: Yes     Alcohol/week: 7.0 standard drinks     Types: 7 Glasses of wine per week     Comment: wine occasionally    Drug use: Never    Sexual activity: Not on file   Lifestyle    Physical activity     Days per week: Not on file     Minutes per session: Not on file    Stress: Not on file   Relationships    Social connections     Talks on phone: Not on file     Gets together: Not on file     Attends Hoahaoism service: Not on file     Active member of club or organization: Not on file     Attends meetings of clubs or organizations: Not on file     Relationship status: Not on file    Intimate partner violence     Fear of current or ex partner: Not on file     Emotionally abused: Not on file     Physically abused: Not on file     Forced sexual activity: Not on file   Other Topics Concern    Not on file   Social History Narrative    Lives in [place], alone, has help at night. ALLERGIES: Augmentin [amoxicillin-pot clavulanate]; Iodinated contrast media; and Nasonex [mometasone]    Review of Systems   Constitutional: Negative for fever. HENT: Negative for facial swelling. Eyes: Negative for visual disturbance. Respiratory: Negative for chest tightness.     Cardiovascular: Negative for chest pain. Gastrointestinal: Negative for abdominal pain. Genitourinary: Negative for difficulty urinating and dysuria. Musculoskeletal: Negative for arthralgias. Skin: Negative for rash. Neurological: Negative for headaches. Hematological: Negative for adenopathy. Psychiatric/Behavioral: Negative for suicidal ideas. There were no vitals filed for this visit. Physical Exam  Vitals signs and nursing note reviewed. Constitutional:       General: She is not in acute distress. Appearance: She is well-developed. HENT:      Head: Normocephalic and atraumatic. Eyes:      General: No scleral icterus. Conjunctiva/sclera: Conjunctivae normal.      Pupils: Pupils are equal, round, and reactive to light. Neck:      Musculoskeletal: Normal range of motion and neck supple. Cardiovascular:      Rate and Rhythm: Normal rate. Heart sounds: No murmur. Pulmonary:      Effort: Pulmonary effort is normal. No respiratory distress. Abdominal:      General: There is no distension. Musculoskeletal: Normal range of motion. Comments: Contusion along the left posterior costal margin. Positive tenderness to palpation. No obvious deformities or step-offs. Skin:     General: Skin is warm and dry. Findings: No rash. Neurological:      Mental Status: She is alert and oriented to person, place, and time. MDM  Number of Diagnoses or Management Options  Compression fracture of L1 lumbar vertebra, closed, initial encounter [facility]):   Contusion of rib on left side, initial encounter:   Injury of head, initial encounter:   Diagnosis management comments: Assessment: No evidence of injury on head CT or chest x-ray. X-ray shows L1 compression fracture. I discussed kyphoplasty with the patient but she is declining at this time. She would prefer to treat the fracture symptomatically and pursue conservative treatment.   I will reach out to discussed this plan with Dr. Santana Re and dispol the patient accordingly. Amount and/or Complexity of Data Reviewed  Tests in the radiology section of CPT®: reviewed      ED Course as of Oct 05 0648   Mon Oct 05, 2020   1928 6:46 AM  I discussed patient with Dr. Cori Mead. She and I were in agreement that the patient may actually benefit significantly from kyphoplasty especially considering the event that she wanted to attend this weekend. MRI is ordered and I will put a consult in for IR.     [JM]      ED Course User Index  [JM] James Arreola MD       Procedures

## 2020-10-05 NOTE — ROUTINE PROCESS
TRANSFER - OUT REPORT: 
 
Verbal report given to Shari Botello RN(name) on Nickie So  being transferred to 6E(unit) for routine progression of care Report consisted of patients Situation, Background, Assessment and  
Recommendations(SBAR). Information from the following report(s) SBAR, ED Summary, [facility] and Recent Results was reviewed with the receiving nurse. Lines:    
 
Opportunity for questions and clarification was provided. Patient transported with: 
 light

## 2020-10-05 NOTE — ED NOTES
Interventional is not able to expeditiously do this procedure. I spoken with Dr. Radha Yeboah and the patient will be admitted and IR will get to her when they can today. She will likely be able to be discharged tomorrow. With the patient's weakness and propensity to falling, it was felt admission and definitive procedure would be reasonable at this time.

## 2020-10-05 NOTE — H&P
Interventional and Vascular Radiology History and Physical    Patient: Cholo Salinas 82 y.o. female       Chief Complaint: Fall and Rib Pain      History of Present Illness: L1 fracture     History:    Past Medical History:   Diagnosis Date    Atrial fibrillation ([facility] 05/06/2015    CTS (carpal tunnel syndrome)     s/p release bilaterally    Environmental allergies     Fracture, humerus     GERD (gastroesophageal reflux disease)     Hematuria 10/10/2011    Hypercholesterolemia 10/10/2011    Menopause     LMP-unknown    OA (osteoarthritis)     Seizure disorder ([facility] [address] 1983    had two seizures in sleep, one at daughter's wedding, all around the same time. On dilantin.  Vitamin D deficiency      Family History   Problem Relation Age of Onset    Other Mother         phlebitis, Dec 89yo    Dementia Mother     Pulmonary Fibrosis Father         Dec 79yo    No Known Problems Sister     Stroke Brother     Atrial Fibrillation Brother     Other Sister         PNA, Dec 83yo    Dementia Sister     No Known Problems Son     No Known Problems Son     No Known Problems Daughter      Social History     Socioeconomic History    Marital status:      Spouse name: Not on file    Number of children: Not on file    Years of education: Not on file    Highest education level: Not on file   Occupational History    Occupation: Retired, [employer] resource strain: Not on file    Food insecurity     Worry: Not on file     Inability: Not on file   [employer] needs     Medical: Not on file     Non-medical: Not on file   Tobacco Use    Smoking status: Never Smoker    Smokeless tobacco: Never Used   Substance and Sexual Activity    Alcohol use:  Yes     Alcohol/week: 7.0 standard drinks     Types: 7 Glasses of wine per week     Comment: wine occasionally    Drug use: Never    Sexual activity: Not on file   Lifestyle    Physical activity     Days per week: Not on file     Minutes per session: Not on file    Stress: Not on file   Relationships    Social connections     Talks on phone: Not on file     Gets together: Not on file     Attends Worship service: Not on file     Active member of club or organization: Not on file     Attends meetings of clubs or organizations: Not on file     Relationship status: Not on file    Intimate partner violence     Fear of current or ex partner: Not on file     Emotionally abused: Not on file     Physically abused: Not on file     Forced sexual activity: Not on file   Other Topics Concern    Not on file   Social History Narrative    Lives in [place], alone, has help at night. Allergies: Allergies   Allergen Reactions    Augmentin [Amoxicillin-Pot Clavulanate] Nausea and Vomiting    Iodinated Contrast Media Rash    Nasonex [Mometasone] Unknown (comments)       Current Medications:  Current Facility-Administered Medications   Medication Dose Route Frequency    lidocaine (XYLOCAINE) 20 mg/mL (2 %) injection        lidocaine 4 % patch 1 Patch  1 Patch TransDERmal NOW    midazolam (PF) (VERSED) injection 5 mg  5 mg IntraVENous Multiple    fentaNYL citrate (PF) injection 200 mcg  200 mcg IntraVENous Multiple    0.9% sodium chloride infusion 500 mL  500 mL IntraVENous CONTINUOUS    saline peripheral flush soln 10 mL  10 mL InterCATHeter PRN    ceFAZolin (ANCEF) 2 g/20 mL in sterile water IV syringe  2 g IntraVENous ONCE    ketorolac (TORADOL) injection 15 mg  15 mg IntraVENous ONCE    lidocaine (XYLOCAINE) 20 mg/mL (2 %) injection 400 mg  20 mL IntraDERMal ONCE    bupivacaine (PF) (MARCAINE) 0.5 % (5 mg/mL) injection 100 mg  20 mL Epidural RAD ONCE    gadobutroL (Gadavist) contrast solution 20 mL  20 mL IntraVENous RAD ONCE        Physical Exam:  Blood pressure (!) 145/60, pulse 87, temperature 98.6 °F (37 °C), resp. rate 21, SpO2 99 %.   LUNG: clear to auscultation bilaterally, HEART: regular rate and rhythm, S1, S2 normal, no murmur, click, rub or gallop      Alerts:    Hospital Problems  Date Reviewed: 1/27/2020          Codes Class Noted POA    Lumbar compression fracture [facility]) ICD-10-CM: S32.000A  ICD-9-CM: 805.4  10/5/2020 Unknown              Laboratory:    No results for input(s): HGB, HCT, WBC, PLT, INR, BUN, CREA, K, CRCLT, HGBEXT, HCTEXT, PLTEXT, INREXT in the last 72 hours. No lab exists for component: PTT, PT      Plan of Care/Planned Procedure:  Risks, benefits, and alternatives reviewed with patient and she agrees to proceed with the procedure. Conscious sedation will be performed with IV fentanyl and versed.  Plan is for Terrence Crocker MD

## 2020-10-05 NOTE — ED NOTES
Bedside and Verbal shift change report given to Niyah Reynolds (oncoming nurse) by Carlitos Newell (offgoing nurse). Report included the following information SBAR, Kardex, ED Summary, [facility] and Recent Results.

## 2020-10-05 NOTE — PROGRESS NOTES
TRANSFER - OUT REPORT:    Verbal report given to Liliana GRIFFIN(name) on Ophelia Oas  being transferred to [facility](unit) for routine progression of care       Report consisted of patients Situation, Background, Assessment and   Recommendations(SBAR). Information from the following report(s) SBAR, Procedure Summary, MAR and Cardiac Rhythm atrial fibrillation was reviewed with the receiving nurse. Lines:   Peripheral IV 10/05/20 Anterior;Right Forearm (Active)        Opportunity for questions and clarification was provided.

## 2020-10-06 VITALS
TEMPERATURE: 97.7 F | HEART RATE: 95 BPM | DIASTOLIC BLOOD PRESSURE: 72 MMHG | OXYGEN SATURATION: 97 % | SYSTOLIC BLOOD PRESSURE: 123 MMHG | RESPIRATION RATE: 18 BRPM

## 2020-10-06 PROCEDURE — 74011250637 HC RX REV CODE- 250/637: Performed by: INTERNAL MEDICINE

## 2020-10-06 PROCEDURE — 99218 HC RM OBSERVATION: CPT

## 2020-10-06 RX ADMIN — TRAMADOL HYDROCHLORIDE 50 MG: 50 TABLET ORAL at 08:13

## 2020-10-06 RX ADMIN — APIXABAN 5 MG: 2.5 TABLET, FILM COATED ORAL at 09:29

## 2020-10-06 RX ADMIN — ACETAMINOPHEN 650 MG: 325 TABLET ORAL at 09:29

## 2020-10-06 RX ADMIN — ACETAMINOPHEN 650 MG: 325 TABLET ORAL at 03:17

## 2020-10-06 RX ADMIN — TRAMADOL HYDROCHLORIDE 50 MG: 50 TABLET ORAL at 01:25

## 2020-10-06 RX ADMIN — PHENYTOIN SODIUM 100 MG: 100 CAPSULE ORAL at 09:29

## 2020-10-06 NOTE — PROGRESS NOTES
Discharge instructions reviewed with patient and caregiver. Instructed to  prescription from pharmacy: lidocaine e-prescribed. Instructed on chest contusion, head injury, and kyphoplasty discharge instructions. Instructed patient to remove kyphoplasty dressings on back tomorrow, per IR and to keep them clean and dry. Patient discharged via wheelchair with caregiver. Patient to be home alone today, but will be checked on frequently. Discussed this with patient, who stated she wanted to return home despite having no one home with her. Caregiver reassured that she would check in frequently.

## 2020-10-06 NOTE — PROGRESS NOTES
Problem: Falls - Risk of  Goal: *Absence of Falls  Description: Document Sonny Quevedo Fall Risk and appropriate interventions in the flowsheet.   Outcome: Progressing Towards Goal  Note: Fall Risk Interventions:  Mobility Interventions: Patient to call before getting OOB              Elimination Interventions: Call light in reach    History of Falls Interventions: Evaluate medications/consider consulting pharmacy

## 2022-06-17 ENCOUNTER — HOSPITAL ENCOUNTER (EMERGENCY)
Age: 87
Discharge: HOME | End: 2022-06-17
Attending: EMERGENCY MEDICINE
Payer: MEDICARE

## 2022-06-17 ENCOUNTER — APPOINTMENT (OUTPATIENT)
Dept: MRI IMAGING | Age: 87
End: 2022-06-17
Attending: EMERGENCY MEDICINE
Payer: MEDICARE

## 2022-06-17 ENCOUNTER — APPOINTMENT (OUTPATIENT)
Dept: CT IMAGING | Age: 87
End: 2022-06-17
Attending: EMERGENCY MEDICINE
Payer: MEDICARE

## 2022-06-17 VITALS
BODY MASS INDEX: 19.69 KG/M2 | SYSTOLIC BLOOD PRESSURE: 142 MMHG | OXYGEN SATURATION: 94 % | HEART RATE: 100 BPM | WEIGHT: 132.94 LBS | RESPIRATION RATE: 18 BRPM | DIASTOLIC BLOOD PRESSURE: 66 MMHG | TEMPERATURE: 97.6 F | HEIGHT: 69 IN

## 2022-06-17 DIAGNOSIS — S12.091A OTHER CLOSED NONDISPLACED FRACTURE OF FIRST CERVICAL VERTEBRA, INITIAL ENCOUNTER (HCC): Primary | ICD-10-CM

## 2022-06-17 DIAGNOSIS — S12.101A CLOSED NONDISPLACED FRACTURE OF SECOND CERVICAL VERTEBRA, UNSPECIFIED FRACTURE MORPHOLOGY, INITIAL ENCOUNTER (HCC): ICD-10-CM

## 2022-06-17 PROCEDURE — 99284 EMERGENCY DEPT VISIT MOD MDM: CPT

## 2022-06-17 PROCEDURE — 72141 MRI NECK SPINE W/O DYE: CPT

## 2022-06-17 PROCEDURE — 70450 CT HEAD/BRAIN W/O DYE: CPT

## 2022-06-17 PROCEDURE — 72125 CT NECK SPINE W/O DYE: CPT

## 2022-06-17 RX ORDER — TRAMADOL HYDROCHLORIDE 50 MG/1
50 TABLET ORAL
Qty: 12 TABLET | Refills: 0 | Status: SHIPPED | OUTPATIENT
Start: 2022-06-17 | End: 2022-06-20

## 2022-06-17 NOTE — DISCHARGE INSTRUCTIONS
CT SPINE CERV WO CONT (Final result)  Result time 06/17/22 17:22:56  Final result by Saira Trujillo MD (06/17/22 17:22:56)                Impression:    1. Acute nondisplaced fractures of C1 and C2 as described above. There is   minimal grade 1 anterolisthesis at C2-3, new since 2020, but no retropulsion of   bone or intraspinal hematoma causing cord compression or canal compromise. 2. Linear lucencies through the base of the skull near the foramen magnum on the   right, thought to represent vascular grooves, but attention on follow-up for   subtle nondisplaced fracture. MRI CERV SPINE WO CONT (Final result)  Result time 06/17/22 19:20:37  Final result by Shiva Alfaro MD (06/17/22 19:20:37)                Impression:    1. Marrow edema in the C2 vertebra within the vertebral body extending into the   posterior elements, consistent with known fracture as seen on CT. Small ventral   and dorsal subligamentous epidural hematomas at the level of C2, resulting in   only minimal spinal canal stenosis. 2. Degenerative changes as detailed above. 3. Normal cervical cord.

## 2022-06-17 NOTE — ED PROVIDER NOTES
EMERGENCY DEPARTMENT HISTORY AND PHYSICAL EXAM      Date: 6/17/2022  Patient Name: Betsey Rollins  Patient Age and Sex: 82 y.o. female     History of Presenting Illness     Chief Complaint   Patient presents with    Head Injury     arrives by rescue; from home; she is speech impaired unable to move on her own. witnessed GLF hit her head on dresser; right side back of head she is on Eliquis. no wound just swelling back of right head       History Provided By: Patient, son, caregiver    HPI: Betsey Rollins is an 31-year-old female with a history of ALS presenting for neck pain. According to caregiver patient fell out of the bed and hit her head on the dresser. Complaining now of headache as well as neck pain. Denying any arm pain or numbness or tingling. Denies any loss of consciousness. Patient is speech impaired secondary to ALS but can answer yes and no questions. There are no other complaints, changes, or physical findings at this time. PCP: Bree Vela MD    No current facility-administered medications on file prior to encounter. Current Outpatient Medications on File Prior to Encounter   Medication Sig Dispense Refill    apixaban (ELIQUIS) 2.5 mg tablet Take 1 Tablet by mouth two (2) times a day. 180 Tablet 3    riluzole (RILUTEK) tablet Take 1 Tablet by mouth every twelve (12) hours. 180 Tablet 3    ciprofloxacin HCl (CIPRO) 500 mg tablet TAKE 1 TABLET BY MOUTH TWICE A DAY 14 Tablet 1    ciprofloxacin HCl (CIPRO) 500 mg tablet Take 1 Tablet by mouth two (2) times a day. 14 Tablet 1    ipratropium (ATROVENT) 42 mcg (0.06 %) nasal spray 2 Sprays by Both Nostrils route four (4) times daily. 15 mL 3    phenytoin ER (DILANTIN ER) 100 mg ER capsule TAKE 1 CAPSULE TWICE A  Cap 3    ergocalciferol (ERGOCALCIFEROL) 1,250 mcg (50,000 unit) capsule Take 1 Cap by mouth two (2) times a week on Wednesday and Saturday.  24 Cap 0    gabapentin (NEURONTIN) 300 mg capsule Take 300 mg by mouth three (3) times daily.  apixaban (Eliquis) 5 mg tablet TAKE 1 TABLET TWICE A  Tab 3    fexofenadine (ALLEGRA) 60 mg tablet Take 180 mg by mouth daily as needed for Allergies.  famotidine (PEPCID) 10 mg tablet Take 10 mg by mouth two (2) times a day.  cholecalciferol (VITAMIN D3) (1000 Units /25 mcg) tablet Take 1,000 Units by mouth two (2) times a day.  acetaminophen (TYLENOL EXTRA STRENGTH) 500 mg tablet Take  by mouth every six (6) hours as needed for Pain.  olopatadine (PATANOL) 0.1 % ophthalmic solution Administer 2 Drops to both eyes two (2) times a day. Past History     Past Medical History:  Past Medical History:   Diagnosis Date    ALS (amyotrophic lateral sclerosis) ([facility]     Atrial fibrillation (HCC) 05/06/2015    Closed compression fracture of body of L1 vertebra [facility]     CTS (carpal tunnel syndrome)     s/p release bilaterally    Environmental allergies     Fracture, humerus     GERD (gastroesophageal reflux disease)     Hematuria 10/10/2011    Hypercholesterolemia 10/10/2011    Menopause     LMP-unknown    OA (osteoarthritis)     Seizure disorder ([facility] 1983    had two seizures in sleep, one at daughter's wedding, all around the same time. On dilantin.     Vitamin D deficiency        Past Surgical History:  Past Surgical History:   Procedure Laterality Date    HX BREAST BIOPSY Right long ago    Benign surgical biopsy    HX CARPAL TUNNEL RELEASE      IR KYPHOPLASTY LUMBAR  10/5/2020    IN D&C AFTER DELIVERY      IN SINUS SURGERY PROC UNLISTED         Family History:  Family History   Problem Relation Age of Onset    Other Mother         phlebitis, Dec 91yo    Dementia Mother     Pulmonary Fibrosis Father         Dec 79yo    No Known Problems Sister     Stroke Brother     Atrial Fibrillation Brother     Other Sister         PNA, Dec 85yo    Dementia Sister     No Known Problems Son     No Known Problems Son     No Known Problems Daughter Social History:  Social History     Tobacco Use    Smoking status: Never Smoker    Smokeless tobacco: Never Used   Substance Use Topics    Alcohol use: Yes     Alcohol/week: 7.0 standard drinks     Types: 7 Glasses of wine per week     Comment: wine occasionally    Drug use: Never       Allergies: Allergies   Allergen Reactions    Augmentin [Amoxicillin-Pot Clavulanate] Nausea and Vomiting    Iodinated Contrast Media Rash    Nasonex [Mometasone] Unknown (comments)         Review of Systems   Review of Systems   Constitutional: Negative for chills and fever. Respiratory: Negative for cough and shortness of breath. Cardiovascular: Negative for chest pain. Gastrointestinal: Negative for abdominal pain, constipation, diarrhea, nausea and vomiting. Genitourinary: Negative for dysuria, frequency and hematuria. Musculoskeletal: Positive for neck pain. Neurological: Positive for headaches. Negative for weakness and numbness. All other systems reviewed and are negative. Physical Exam   Physical Exam  Vitals and nursing note reviewed. Constitutional:       Appearance: She is well-developed. HENT:      Head: Normocephalic and atraumatic. Nose: Nose normal.      Mouth/Throat:      Mouth: Mucous membranes are moist.   Eyes:      Extraocular Movements: Extraocular movements intact. Conjunctiva/sclera: Conjunctivae normal.   Neck:      Comments: Patient is in a cervical collar. Some tenderness over her superior cervical spine  Cardiovascular:      Rate and Rhythm: Normal rate and regular rhythm. Pulmonary:      Effort: Pulmonary effort is normal. No respiratory distress. Breath sounds: Normal breath sounds. Abdominal:      General: There is no distension. Palpations: Abdomen is soft. Tenderness: There is no abdominal tenderness. Musculoskeletal:         General: Normal range of motion. Skin:     General: Skin is warm and dry.    Neurological:      General: No focal deficit present. Mental Status: She is alert. Mental status is at baseline. Comments: Patient has ALS is speech impaired and can communicate with me either using her phone and typing long answers or thumbs up thumbs down   Psychiatric:         Mood and Affect: Mood normal.          Diagnostic Study Results     Labs -   No results found for this or any previous visit (from the past 12 hour(s)). Radiologic Studies -   MRI CERV SPINE WO CONT   Final Result   1. Marrow edema in the C2 vertebra within the vertebral body extending into the   posterior elements, consistent with known fracture as seen on CT. Small ventral   and dorsal subligamentous epidural hematomas at the level of C2, resulting in   only minimal spinal canal stenosis. 2. Degenerative changes as detailed above. 3. Normal cervical cord. CT HEAD WO CONT   Final Result   No acute intracranial process. Imaging findings consistent with moderate chronic microvascular ischemic change. There is a moderate degree of cerebral atrophy. CT SPINE CERV WO CONT   Final Result   1. Acute nondisplaced fractures of C1 and C2 as described above. There is   minimal grade 1 anterolisthesis at C2-3, new since 2020, but no retropulsion of   bone or intraspinal hematoma causing cord compression or canal compromise. 2. Linear lucencies through the base of the skull near the foramen magnum on the   right, thought to represent vascular grooves, but attention on follow-up for   subtle nondisplaced fracture. The findings were called to Dr. Anayeli Mckenzie on 6/17/2022 at 1722 hours by Dr. Yarelis Garcia. [phone]        CT Results  (Last 48 hours)               06/17/22 1639  CT HEAD WO CONT Final result    Impression:  No acute intracranial process. Imaging findings consistent with moderate chronic microvascular ischemic change. There is a moderate degree of cerebral atrophy.                Narrative:  CLINICAL HISTORY: fell backwards hit back of head on night stand per caregiver;   pt is on eliquis. INDICATION: fell backwards hit back of head on night stand per caregiver; pt is   on eliquis. COMPARISON: 2020. CT dose reduction was achieved through use of a standardized protocol tailored   for this examination and automatic exposure control for dose modulation. TECHNIQUE: Serial axial images with a collimation of 5 mm were obtained from the   skull base through the vertex     FINDINGS:    There is sulcal and ventricular prominence. Confluent periventricular and   scattered foci of hypodensity in the cerebral white matter. There is no evidence   of an acute infarction, hemorrhage, or mass-effect. There is no evidence of   midline shift or hydrocephalus. Posterior fossa structures are unremarkable. No   extra-axial collections are seen. Mastoid air cells are well pneumatized and clear. There is no evidence of depressed skull fractures of soft tissue swelling.           06/17/22 1639  CT SPINE CERV WO CONT Final result    Impression:  1. Acute nondisplaced fractures of C1 and C2 as described above. There is   minimal grade 1 anterolisthesis at C2-3, new since 2020, but no retropulsion of   bone or intraspinal hematoma causing cord compression or canal compromise. 2. Linear lucencies through the base of the skull near the foramen magnum on the   right, thought to represent vascular grooves, but attention on follow-up for   subtle nondisplaced fracture. The findings were called to Dr. David Colvin on 6/17/2022 at 1722 hours by Dr. Deon Pantoja. [phone]       Narrative:  EXAM:  CT CERVICAL SPINE WITHOUT CONTRAST       INDICATION: neck hurts to move side to side. Patient thought to have fallen. COMPARISON: MRI cervical spine 6/30/2020       CONTRAST:  None. TECHNIQUE: Multislice helical CT of the cervical spine was performed without   intravenous contrast administration. Sagittal and coronal reformats were   generated.   CT dose reduction was achieved through use of a standardized   protocol tailored for this examination and automatic exposure control for dose   modulation. FINDINGS:       Alignment is remarkable for 3.2 mm anterolisthesis at C2-3, not seen on the   prior study. There is a fracture nondisplaced in the lateral masses of C1   bilaterally, best visualized on coronal series 401. In that plane, there may be   very mild rightward subluxation of C1 on C2 There has also been a transverse   fracture of the odontoid process of C2, also new, without displacement. The   fracture extends to the right pedicle and also inferiorly along the posterior   vertebral body to the superior intervertebral endplate at X4-2. There is   spondylosis at C2-3 but no ila retropulsion of fractured bone and no   intraspinal hematoma or mass. A linear lucency is noted through the right base   of skull, axial bone series 2 image 24. A subtle nondisplaced fracture is   difficult to exclude, but this is thought to represent a vascular groove most   likely. . The craniocervical junction is otherwise within normal limits. Other   levels show no acute fracture. The incidentally imaged soft tissues are within normal limits. Biapical   pleural-parenchymal scar noted. There is no intraspinal mass or hematoma. Multilevel central and foraminal stenosis due to degenerative change, but no   cord compression, hematoma or canal compromise at the craniocervical junction or   in the upper cervical spine. CXR Results  (Last 48 hours)    None            Medical Decision Making   I am the first provider for this patient. I reviewed the vital signs, available nursing notes, past medical history, past surgical history, family history and social history. Vital Signs-Reviewed the patient's vital signs.   Patient Vitals for the past 12 hrs:   Temp Pulse Resp BP SpO2   06/1937 -- -- -- (!) 142/66 94 %   06/17/22 1922 -- -- -- Kacie Mckeon 144/75 96 %   06/17/22 1920 97.6 °F (36.4 °C) 100 18 (!) 144/75 95 %   06/17/22 1603 97.9 °F (36.6 °C) 100 16 (!) 162/69 97 %       Records Reviewed: Nursing Notes and Old Medical Records    Provider Notes (Medical Decision Making):   . Patient presenting with neck pain and headache after fall. CT of her head and cervical spine shows C1 and C2 nondisplaced fracture. I ordered an MRI and will consult neurosurgery. ED Course:   Initial assessment performed. The patients presenting problems have been discussed, and they are in agreement with the care plan formulated and outlined with them. I have encouraged them to ask questions as they arise throughout their visit. ED Course as of 06/17/22 2137 Fri Jun 17, 2022   1840 Currently an MRI so we will do physical exam when she returns [JS]   1934 Back from MRI stating that she does not have any numbness or weakness of her arms. Is having a headache as well as neck pain. [JS]   80 Spoke with neurosurgery, Jenny Hutchison who states that given that patient is on hospice, it is a nondisplaced fracture, plan will be for a soft cervical collar and she could wear that for 6 weeks or until the pain resolves. [JS]      ED Course User Index  [JS] Neal Morales MD     Critical Care Time:   0    Disposition:  Discharge Note:  The patient has been re-evaluated and is ready for discharge. Reviewed available results with patient. Counseled patient on diagnosis and care plan. Patient has expressed understanding, and all questions have been answered. Patient agrees with plan and agrees to follow up as recommended, or to return to the ED if their symptoms worsen. Discharge instructions have been provided and explained to the patient, along with reasons to return to the ED.       PLAN:  Discharge Medication List as of 6/17/2022  7:45 PM      CONTINUE these medications which have NOT CHANGED    Details   !! apixaban (ELIQUIS) 2.5 mg tablet Take 1 Tablet by mouth two (2) times a day., Normal, Disp-180 Tablet, R-3      riluzole (RILUTEK) tablet Take 1 Tablet by mouth every twelve (12) hours. , Normal, Disp-180 Tablet, R-3      !! ciprofloxacin HCl (CIPRO) 500 mg tablet TAKE 1 TABLET BY MOUTH TWICE A DAY, Normal, Disp-14 Tablet, R-1      !! ciprofloxacin HCl (CIPRO) 500 mg tablet Take 1 Tablet by mouth two (2) times a day., Normal, Disp-14 Tablet, R-1      ipratropium (ATROVENT) 42 mcg (0.06 %) nasal spray 2 Sprays by Both Nostrils route four (4) times daily. , Normal, Disp-15 mL, R-3      phenytoin ER (DILANTIN ER) 100 mg ER capsule TAKE 1 CAPSULE TWICE A DAY, Normal, Disp-180 Cap, R-3      ergocalciferol (ERGOCALCIFEROL) 1,250 mcg (50,000 unit) capsule Take 1 Cap by mouth two (2) times a week on Wednesday and Saturday., Normal, Disp-24 Cap, R-0      gabapentin (NEURONTIN) 300 mg capsule Take 300 mg by mouth three (3) times daily. , Historical Med      !! apixaban (Eliquis) 5 mg tablet TAKE 1 TABLET TWICE A DAY, Normal, Disp-180 Tab, R-3      fexofenadine (ALLEGRA) 60 mg tablet Take 180 mg by mouth daily as needed for Allergies. , Historical Med      famotidine (PEPCID) 10 mg tablet Take 10 mg by mouth two (2) times a day., Historical Med      cholecalciferol (VITAMIN D3) (1000 Units /25 mcg) tablet Take 1,000 Units by mouth two (2) times a day., Historical Med      acetaminophen (TYLENOL EXTRA STRENGTH) 500 mg tablet Take  by mouth every six (6) hours as needed for Pain., Historical Med      olopatadine (PATANOL) 0.1 % ophthalmic solution Administer 2 Drops to both eyes two (2) times a day., Historical Med       !! - Potential duplicate medications found. Please discuss with provider. 2.   Follow-up Information     Follow up With Specialties Details Why Contact Info    Dominique Phillips MD Neurosurgery Schedule an appointment as soon as possible for a visit  As needed [address] [phone]          3.   Return to ED if worse     Diagnosis     Clinical Impression:   1. Other closed nondisplaced fracture of first cervical vertebra, initial encounter ([facility]    2. Closed nondisplaced fracture of second cervical vertebra, unspecified fracture morphology, initial encounter ([facility] [facility]        Attestations:    Gordo Askew M.D. Please note that this dictation was completed with MedPro, the computer voice recognition software. Quite often unanticipated grammatical, syntax, homophones, and other interpretive errors are inadvertently transcribed by the computer software. Please disregard these errors. Please excuse any errors that have escaped final proofreading. Thank you.

## 2022-06-17 NOTE — CONSULTS
87yo on hospice for end stage ALS. Fall today with nondisplaced odontoid fracture. Ligaments intact on MRI. Treat with soft collar as need for pain control. No further workup indicated. No need for follow up.

## 2024-04-23 NOTE — PROGRESS NOTES
Bettina Daniel - Please call pt: MRI C and T-spine wo contrast 6/30/20 without explanation for symptoms. She does have age related degenerative changes with narrowing of spinal canal in places, but this would not explain her sxs. We can review imaging together at f/u. Please forward MRI results to Dr. Mckenzie Corado at [facility]. DISCHARGE